# Patient Record
Sex: MALE | Race: WHITE | Employment: FULL TIME | URBAN - METROPOLITAN AREA
[De-identification: names, ages, dates, MRNs, and addresses within clinical notes are randomized per-mention and may not be internally consistent; named-entity substitution may affect disease eponyms.]

---

## 2017-02-23 ENCOUNTER — OFFICE VISIT (OUTPATIENT)
Dept: RHEUMATOLOGY | Age: 47
End: 2017-02-23

## 2017-02-23 VITALS
BODY MASS INDEX: 31.12 KG/M2 | SYSTOLIC BLOOD PRESSURE: 138 MMHG | DIASTOLIC BLOOD PRESSURE: 80 MMHG | HEART RATE: 78 BPM | WEIGHT: 242.4 LBS

## 2017-02-23 DIAGNOSIS — Z79.899 ENCOUNTER FOR LONG-TERM (CURRENT) USE OF MEDICATIONS: ICD-10-CM

## 2017-02-23 DIAGNOSIS — M45.9 ANKYLOSING SPONDYLITIS (HCC): Primary | ICD-10-CM

## 2017-02-23 LAB
ALBUMIN SERPL-MCNC: 4.4 G/DL (ref 3.4–5)
ALP BLD-CCNC: 79 U/L (ref 40–129)
ALT SERPL-CCNC: 27 U/L (ref 10–40)
AST SERPL-CCNC: 20 U/L (ref 15–37)
BASOPHILS ABSOLUTE: 0.1 K/UL (ref 0–0.2)
BASOPHILS RELATIVE PERCENT: 0.7 %
BILIRUB SERPL-MCNC: 0.5 MG/DL (ref 0–1)
BILIRUBIN DIRECT: <0.2 MG/DL (ref 0–0.3)
BILIRUBIN, INDIRECT: NORMAL MG/DL (ref 0–1)
CREAT SERPL-MCNC: 0.9 MG/DL (ref 0.9–1.3)
EOSINOPHILS ABSOLUTE: 0.4 K/UL (ref 0–0.6)
EOSINOPHILS RELATIVE PERCENT: 3.3 %
GFR AFRICAN AMERICAN: >60
GFR NON-AFRICAN AMERICAN: >60
HCT VFR BLD CALC: 45.3 % (ref 40.5–52.5)
HEMOGLOBIN: 14.9 G/DL (ref 13.5–17.5)
LYMPHOCYTES ABSOLUTE: 2.6 K/UL (ref 1–5.1)
LYMPHOCYTES RELATIVE PERCENT: 19.9 %
MCH RBC QN AUTO: 28.3 PG (ref 26–34)
MCHC RBC AUTO-ENTMCNC: 33 G/DL (ref 31–36)
MCV RBC AUTO: 85.8 FL (ref 80–100)
MONOCYTES ABSOLUTE: 1 K/UL (ref 0–1.3)
MONOCYTES RELATIVE PERCENT: 7.7 %
NEUTROPHILS ABSOLUTE: 8.8 K/UL (ref 1.7–7.7)
NEUTROPHILS RELATIVE PERCENT: 68.4 %
PDW BLD-RTO: 14 % (ref 12.4–15.4)
PLATELET # BLD: 247 K/UL (ref 135–450)
PMV BLD AUTO: 8.5 FL (ref 5–10.5)
RBC # BLD: 5.28 M/UL (ref 4.2–5.9)
TOTAL PROTEIN: 7 G/DL (ref 6.4–8.2)
WBC # BLD: 12.9 K/UL (ref 4–11)

## 2017-02-23 PROCEDURE — 99213 OFFICE O/P EST LOW 20 MIN: CPT | Performed by: INTERNAL MEDICINE

## 2017-02-23 RX ORDER — OMEPRAZOLE 20 MG/1
20 CAPSULE, DELAYED RELEASE ORAL DAILY
Qty: 90 CAPSULE | Refills: 1 | Status: SHIPPED | OUTPATIENT
Start: 2017-02-23 | End: 2017-11-03 | Stop reason: SDUPTHER

## 2017-02-28 RX ORDER — SULFASALAZINE 500 MG/1
TABLET ORAL
Qty: 180 TABLET | Refills: 1 | Status: SHIPPED | OUTPATIENT
Start: 2017-02-28 | End: 2017-08-24 | Stop reason: SDUPTHER

## 2017-03-25 DIAGNOSIS — M45.9 ANKYLOSING SPONDYLITIS (HCC): ICD-10-CM

## 2017-03-27 RX ORDER — ETODOLAC 400 MG/1
TABLET, FILM COATED ORAL
Qty: 90 TABLET | Refills: 1 | Status: SHIPPED | OUTPATIENT
Start: 2017-03-27 | End: 2017-08-24 | Stop reason: SDUPTHER

## 2017-08-24 ENCOUNTER — OFFICE VISIT (OUTPATIENT)
Dept: RHEUMATOLOGY | Age: 47
End: 2017-08-24

## 2017-08-24 VITALS
HEART RATE: 78 BPM | SYSTOLIC BLOOD PRESSURE: 118 MMHG | BODY MASS INDEX: 31.25 KG/M2 | WEIGHT: 243.4 LBS | DIASTOLIC BLOOD PRESSURE: 80 MMHG

## 2017-08-24 DIAGNOSIS — M45.7 ANKYLOSING SPONDYLITIS OF LUMBOSACRAL REGION (HCC): Primary | ICD-10-CM

## 2017-08-24 DIAGNOSIS — Z79.899 ENCOUNTER FOR LONG-TERM (CURRENT) USE OF OTHER MEDICATIONS: ICD-10-CM

## 2017-08-24 LAB
ALBUMIN SERPL-MCNC: 4.5 G/DL (ref 3.4–5)
ALP BLD-CCNC: 87 U/L (ref 40–129)
ALT SERPL-CCNC: 30 U/L (ref 10–40)
AST SERPL-CCNC: 24 U/L (ref 15–37)
BASOPHILS ABSOLUTE: 0.1 K/UL (ref 0–0.2)
BASOPHILS RELATIVE PERCENT: 1.3 %
BILIRUB SERPL-MCNC: 0.4 MG/DL (ref 0–1)
BILIRUBIN DIRECT: <0.2 MG/DL (ref 0–0.3)
BILIRUBIN, INDIRECT: NORMAL MG/DL (ref 0–1)
CREAT SERPL-MCNC: 0.9 MG/DL (ref 0.9–1.3)
EOSINOPHILS ABSOLUTE: 0.5 K/UL (ref 0–0.6)
EOSINOPHILS RELATIVE PERCENT: 6.1 %
GFR AFRICAN AMERICAN: >60
GFR NON-AFRICAN AMERICAN: >60
HCT VFR BLD CALC: 47.2 % (ref 40.5–52.5)
HEMOGLOBIN: 15.7 G/DL (ref 13.5–17.5)
LYMPHOCYTES ABSOLUTE: 2.6 K/UL (ref 1–5.1)
LYMPHOCYTES RELATIVE PERCENT: 34 %
MCH RBC QN AUTO: 29.2 PG (ref 26–34)
MCHC RBC AUTO-ENTMCNC: 33.3 G/DL (ref 31–36)
MCV RBC AUTO: 87.6 FL (ref 80–100)
MONOCYTES ABSOLUTE: 0.6 K/UL (ref 0–1.3)
MONOCYTES RELATIVE PERCENT: 7.9 %
NEUTROPHILS ABSOLUTE: 3.9 K/UL (ref 1.7–7.7)
NEUTROPHILS RELATIVE PERCENT: 50.7 %
PDW BLD-RTO: 13.5 % (ref 12.4–15.4)
PLATELET # BLD: 282 K/UL (ref 135–450)
PMV BLD AUTO: 8.4 FL (ref 5–10.5)
RBC # BLD: 5.39 M/UL (ref 4.2–5.9)
TOTAL PROTEIN: 7.3 G/DL (ref 6.4–8.2)
WBC # BLD: 7.6 K/UL (ref 4–11)

## 2017-08-24 PROCEDURE — 99213 OFFICE O/P EST LOW 20 MIN: CPT | Performed by: INTERNAL MEDICINE

## 2017-08-24 RX ORDER — ETODOLAC 400 MG/1
TABLET, FILM COATED ORAL
Qty: 90 TABLET | Refills: 1 | Status: SHIPPED | OUTPATIENT
Start: 2017-08-24 | End: 2018-04-04 | Stop reason: SDUPTHER

## 2017-08-24 RX ORDER — SULFASALAZINE 500 MG/1
TABLET ORAL
Qty: 180 TABLET | Refills: 1 | Status: SHIPPED | OUTPATIENT
Start: 2017-08-24 | End: 2019-10-31 | Stop reason: SDUPTHER

## 2017-11-03 RX ORDER — OMEPRAZOLE 20 MG/1
CAPSULE, DELAYED RELEASE ORAL
Qty: 90 CAPSULE | Refills: 1 | Status: SHIPPED | OUTPATIENT
Start: 2017-11-03 | End: 2018-04-04 | Stop reason: SDUPTHER

## 2018-02-22 ENCOUNTER — TELEPHONE (OUTPATIENT)
Dept: INTERNAL MEDICINE CLINIC | Age: 48
End: 2018-02-22

## 2018-04-04 ENCOUNTER — OFFICE VISIT (OUTPATIENT)
Dept: RHEUMATOLOGY | Age: 48
End: 2018-04-04

## 2018-04-04 VITALS
SYSTOLIC BLOOD PRESSURE: 120 MMHG | HEIGHT: 74 IN | DIASTOLIC BLOOD PRESSURE: 84 MMHG | HEART RATE: 72 BPM | WEIGHT: 249.5 LBS | BODY MASS INDEX: 32.02 KG/M2

## 2018-04-04 DIAGNOSIS — M45.7 ANKYLOSING SPONDYLITIS OF LUMBOSACRAL REGION (HCC): Primary | ICD-10-CM

## 2018-04-04 DIAGNOSIS — Z79.899 ENCOUNTER FOR LONG-TERM (CURRENT) USE OF MEDICATIONS: ICD-10-CM

## 2018-04-04 LAB
ALBUMIN SERPL-MCNC: 4.8 G/DL (ref 3.4–5)
ALP BLD-CCNC: 74 U/L (ref 40–129)
ALT SERPL-CCNC: 38 U/L (ref 10–40)
AST SERPL-CCNC: 26 U/L (ref 15–37)
BASOPHILS ABSOLUTE: 0.1 K/UL (ref 0–0.2)
BASOPHILS RELATIVE PERCENT: 1.3 %
BILIRUB SERPL-MCNC: 0.5 MG/DL (ref 0–1)
BILIRUBIN DIRECT: <0.2 MG/DL (ref 0–0.3)
BILIRUBIN, INDIRECT: NORMAL MG/DL (ref 0–1)
CREAT SERPL-MCNC: 1.1 MG/DL (ref 0.9–1.3)
EOSINOPHILS ABSOLUTE: 0.5 K/UL (ref 0–0.6)
EOSINOPHILS RELATIVE PERCENT: 7 %
GFR AFRICAN AMERICAN: >60
GFR NON-AFRICAN AMERICAN: >60
HCT VFR BLD CALC: 46.5 % (ref 40.5–52.5)
HEMOGLOBIN: 15.8 G/DL (ref 13.5–17.5)
LYMPHOCYTES ABSOLUTE: 2.7 K/UL (ref 1–5.1)
LYMPHOCYTES RELATIVE PERCENT: 39 %
MCH RBC QN AUTO: 29.1 PG (ref 26–34)
MCHC RBC AUTO-ENTMCNC: 34.1 G/DL (ref 31–36)
MCV RBC AUTO: 85.3 FL (ref 80–100)
MONOCYTES ABSOLUTE: 0.8 K/UL (ref 0–1.3)
MONOCYTES RELATIVE PERCENT: 11.4 %
NEUTROPHILS ABSOLUTE: 2.8 K/UL (ref 1.7–7.7)
NEUTROPHILS RELATIVE PERCENT: 41.3 %
PDW BLD-RTO: 13.7 % (ref 12.4–15.4)
PLATELET # BLD: 281 K/UL (ref 135–450)
PMV BLD AUTO: 8 FL (ref 5–10.5)
RBC # BLD: 5.45 M/UL (ref 4.2–5.9)
TOTAL PROTEIN: 7.4 G/DL (ref 6.4–8.2)
WBC # BLD: 6.9 K/UL (ref 4–11)

## 2018-04-04 PROCEDURE — 99213 OFFICE O/P EST LOW 20 MIN: CPT | Performed by: INTERNAL MEDICINE

## 2018-04-04 RX ORDER — OMEPRAZOLE 20 MG/1
CAPSULE, DELAYED RELEASE ORAL
Qty: 90 CAPSULE | Refills: 1 | Status: SHIPPED | OUTPATIENT
Start: 2018-04-04 | End: 2018-10-04 | Stop reason: SDUPTHER

## 2018-04-04 RX ORDER — ETODOLAC 400 MG/1
TABLET, FILM COATED ORAL
Qty: 90 TABLET | Refills: 1 | Status: SHIPPED | OUTPATIENT
Start: 2018-04-04 | End: 2019-04-04 | Stop reason: SDUPTHER

## 2018-10-04 ENCOUNTER — OFFICE VISIT (OUTPATIENT)
Dept: RHEUMATOLOGY | Age: 48
End: 2018-10-04
Payer: COMMERCIAL

## 2018-10-04 VITALS
WEIGHT: 244 LBS | SYSTOLIC BLOOD PRESSURE: 126 MMHG | HEART RATE: 74 BPM | DIASTOLIC BLOOD PRESSURE: 80 MMHG | BODY MASS INDEX: 31.33 KG/M2

## 2018-10-04 DIAGNOSIS — Z79.899 ENCOUNTER FOR LONG-TERM (CURRENT) USE OF MEDICATIONS: ICD-10-CM

## 2018-10-04 DIAGNOSIS — M45.7 ANKYLOSING SPONDYLITIS OF LUMBOSACRAL REGION (HCC): Primary | ICD-10-CM

## 2018-10-04 LAB
ALBUMIN SERPL-MCNC: 4.7 G/DL (ref 3.4–5)
ALP BLD-CCNC: 78 U/L (ref 40–129)
ALT SERPL-CCNC: 37 U/L (ref 10–40)
AST SERPL-CCNC: 27 U/L (ref 15–37)
BASOPHILS ABSOLUTE: 0.1 K/UL (ref 0–0.2)
BASOPHILS RELATIVE PERCENT: 1.3 %
BILIRUB SERPL-MCNC: 0.4 MG/DL (ref 0–1)
BILIRUBIN DIRECT: <0.2 MG/DL (ref 0–0.3)
BILIRUBIN, INDIRECT: NORMAL MG/DL (ref 0–1)
CREAT SERPL-MCNC: 0.9 MG/DL (ref 0.9–1.3)
EOSINOPHILS ABSOLUTE: 0.3 K/UL (ref 0–0.6)
EOSINOPHILS RELATIVE PERCENT: 4.5 %
GFR AFRICAN AMERICAN: >60
GFR NON-AFRICAN AMERICAN: >60
HCT VFR BLD CALC: 44.7 % (ref 40.5–52.5)
HEMOGLOBIN: 15 G/DL (ref 13.5–17.5)
LYMPHOCYTES ABSOLUTE: 2.3 K/UL (ref 1–5.1)
LYMPHOCYTES RELATIVE PERCENT: 36.9 %
MCH RBC QN AUTO: 29.8 PG (ref 26–34)
MCHC RBC AUTO-ENTMCNC: 33.6 G/DL (ref 31–36)
MCV RBC AUTO: 88.6 FL (ref 80–100)
MONOCYTES ABSOLUTE: 0.7 K/UL (ref 0–1.3)
MONOCYTES RELATIVE PERCENT: 10.6 %
NEUTROPHILS ABSOLUTE: 2.9 K/UL (ref 1.7–7.7)
NEUTROPHILS RELATIVE PERCENT: 46.7 %
PDW BLD-RTO: 13.6 % (ref 12.4–15.4)
PLATELET # BLD: 267 K/UL (ref 135–450)
PMV BLD AUTO: 8.2 FL (ref 5–10.5)
RBC # BLD: 5.04 M/UL (ref 4.2–5.9)
TOTAL PROTEIN: 7.3 G/DL (ref 6.4–8.2)
WBC # BLD: 6.2 K/UL (ref 4–11)

## 2018-10-04 PROCEDURE — 99213 OFFICE O/P EST LOW 20 MIN: CPT | Performed by: INTERNAL MEDICINE

## 2018-10-04 RX ORDER — OMEPRAZOLE 20 MG/1
CAPSULE, DELAYED RELEASE ORAL
Qty: 90 CAPSULE | Refills: 1 | Status: SHIPPED | OUTPATIENT
Start: 2018-10-04 | End: 2019-04-04 | Stop reason: SDUPTHER

## 2019-04-04 ENCOUNTER — OFFICE VISIT (OUTPATIENT)
Dept: RHEUMATOLOGY | Age: 49
End: 2019-04-04
Payer: COMMERCIAL

## 2019-04-04 VITALS
SYSTOLIC BLOOD PRESSURE: 124 MMHG | BODY MASS INDEX: 32.08 KG/M2 | DIASTOLIC BLOOD PRESSURE: 80 MMHG | HEART RATE: 88 BPM | HEIGHT: 74 IN | WEIGHT: 250 LBS

## 2019-04-04 DIAGNOSIS — M45.7 ANKYLOSING SPONDYLITIS OF LUMBOSACRAL REGION (HCC): Primary | ICD-10-CM

## 2019-04-04 DIAGNOSIS — Z79.899 ENCOUNTER FOR LONG-TERM (CURRENT) USE OF MEDICATIONS: ICD-10-CM

## 2019-04-04 LAB
ALBUMIN SERPL-MCNC: 4.5 G/DL (ref 3.4–5)
ALP BLD-CCNC: 79 U/L (ref 40–129)
ALT SERPL-CCNC: 45 U/L (ref 10–40)
AST SERPL-CCNC: 33 U/L (ref 15–37)
BASOPHILS ABSOLUTE: 0.1 K/UL (ref 0–0.2)
BASOPHILS RELATIVE PERCENT: 1.2 %
BILIRUB SERPL-MCNC: 0.4 MG/DL (ref 0–1)
BILIRUBIN DIRECT: <0.2 MG/DL (ref 0–0.3)
BILIRUBIN, INDIRECT: ABNORMAL MG/DL (ref 0–1)
CREAT SERPL-MCNC: 0.9 MG/DL (ref 0.9–1.3)
EOSINOPHILS ABSOLUTE: 0.4 K/UL (ref 0–0.6)
EOSINOPHILS RELATIVE PERCENT: 5.7 %
GFR AFRICAN AMERICAN: >60
GFR NON-AFRICAN AMERICAN: >60
HCT VFR BLD CALC: 46.7 % (ref 40.5–52.5)
HEMOGLOBIN: 15.5 G/DL (ref 13.5–17.5)
LYMPHOCYTES ABSOLUTE: 2.5 K/UL (ref 1–5.1)
LYMPHOCYTES RELATIVE PERCENT: 33.7 %
MCH RBC QN AUTO: 29.2 PG (ref 26–34)
MCHC RBC AUTO-ENTMCNC: 33.2 G/DL (ref 31–36)
MCV RBC AUTO: 88 FL (ref 80–100)
MONOCYTES ABSOLUTE: 0.8 K/UL (ref 0–1.3)
MONOCYTES RELATIVE PERCENT: 11 %
NEUTROPHILS ABSOLUTE: 3.5 K/UL (ref 1.7–7.7)
NEUTROPHILS RELATIVE PERCENT: 48.4 %
PDW BLD-RTO: 13.5 % (ref 12.4–15.4)
PLATELET # BLD: 282 K/UL (ref 135–450)
PMV BLD AUTO: 8.3 FL (ref 5–10.5)
RBC # BLD: 5.31 M/UL (ref 4.2–5.9)
TOTAL PROTEIN: 7.2 G/DL (ref 6.4–8.2)
WBC # BLD: 7.3 K/UL (ref 4–11)

## 2019-04-04 PROCEDURE — 99213 OFFICE O/P EST LOW 20 MIN: CPT | Performed by: INTERNAL MEDICINE

## 2019-04-04 RX ORDER — OMEPRAZOLE 20 MG/1
CAPSULE, DELAYED RELEASE ORAL
Qty: 90 CAPSULE | Refills: 1 | Status: SHIPPED | OUTPATIENT
Start: 2019-04-04 | End: 2019-10-31 | Stop reason: SDUPTHER

## 2019-04-04 RX ORDER — ETODOLAC 400 MG/1
TABLET, FILM COATED ORAL
Qty: 90 TABLET | Refills: 1 | Status: SHIPPED | OUTPATIENT
Start: 2019-04-04 | End: 2019-10-31 | Stop reason: SDUPTHER

## 2019-04-04 NOTE — PROGRESS NOTES
Subjective:      Patient ID: Kai Presley is a 57 y.o. male. HPI  The patient returns for follow-up of ankylosing spondylitis. He continues on Lodine 400 mg once a day and Azulfidine 1000 mg daily. He can do all ADLs. Review of Systems  Denies abdominal pain denies nausea denies rash denies back pain denies morning stiffness  Objective:   Physical Exam  /80   Pulse 88   Ht 6' 2\" (1.88 m)   Wt 250 lb (113.4 kg)   BMI 32.10 kg/m² alert  Male in no acute distress lungs clear to auscultation cardiovascular exam normal sinus rhythm. Musculoskeletal exam revealed neck full range of motion back full range of motion no peripheral synovitis  Assessment: Ankylosing spondylitis stable      Plan:      Blood work was obtained today to monitor for adverse drug reactions. Laboratory studies from last visit were reviewed. I'll see patient back in 6 months time.         Gunner Tang MD

## 2019-10-31 ENCOUNTER — OFFICE VISIT (OUTPATIENT)
Dept: RHEUMATOLOGY | Age: 49
End: 2019-10-31
Payer: COMMERCIAL

## 2019-10-31 VITALS
DIASTOLIC BLOOD PRESSURE: 94 MMHG | HEART RATE: 64 BPM | HEIGHT: 74 IN | WEIGHT: 257.8 LBS | BODY MASS INDEX: 33.09 KG/M2 | SYSTOLIC BLOOD PRESSURE: 136 MMHG

## 2019-10-31 DIAGNOSIS — Z79.899 ENCOUNTER FOR LONG-TERM (CURRENT) USE OF MEDICATIONS: ICD-10-CM

## 2019-10-31 DIAGNOSIS — M45.7 ANKYLOSING SPONDYLITIS OF LUMBOSACRAL REGION (HCC): Primary | ICD-10-CM

## 2019-10-31 LAB
ALBUMIN SERPL-MCNC: 4.6 G/DL (ref 3.4–5)
ALP BLD-CCNC: 84 U/L (ref 40–129)
ALT SERPL-CCNC: 44 U/L (ref 10–40)
AST SERPL-CCNC: 34 U/L (ref 15–37)
BASOPHILS ABSOLUTE: 0.1 K/UL (ref 0–0.2)
BASOPHILS RELATIVE PERCENT: 1.1 %
BILIRUB SERPL-MCNC: 0.5 MG/DL (ref 0–1)
BILIRUBIN DIRECT: <0.2 MG/DL (ref 0–0.3)
BILIRUBIN, INDIRECT: ABNORMAL MG/DL (ref 0–1)
CREAT SERPL-MCNC: 0.9 MG/DL (ref 0.9–1.3)
EOSINOPHILS ABSOLUTE: 0.3 K/UL (ref 0–0.6)
EOSINOPHILS RELATIVE PERCENT: 4 %
GFR AFRICAN AMERICAN: >60
GFR NON-AFRICAN AMERICAN: >60
HCT VFR BLD CALC: 47.3 % (ref 40.5–52.5)
HEMOGLOBIN: 15.8 G/DL (ref 13.5–17.5)
LYMPHOCYTES ABSOLUTE: 2.9 K/UL (ref 1–5.1)
LYMPHOCYTES RELATIVE PERCENT: 38.7 %
MCH RBC QN AUTO: 29.4 PG (ref 26–34)
MCHC RBC AUTO-ENTMCNC: 33.3 G/DL (ref 31–36)
MCV RBC AUTO: 88.1 FL (ref 80–100)
MONOCYTES ABSOLUTE: 0.9 K/UL (ref 0–1.3)
MONOCYTES RELATIVE PERCENT: 11.9 %
NEUTROPHILS ABSOLUTE: 3.3 K/UL (ref 1.7–7.7)
NEUTROPHILS RELATIVE PERCENT: 44.3 %
PDW BLD-RTO: 13.5 % (ref 12.4–15.4)
PLATELET # BLD: 268 K/UL (ref 135–450)
PMV BLD AUTO: 8.3 FL (ref 5–10.5)
RBC # BLD: 5.37 M/UL (ref 4.2–5.9)
TOTAL PROTEIN: 7.6 G/DL (ref 6.4–8.2)
WBC # BLD: 7.5 K/UL (ref 4–11)

## 2019-10-31 PROCEDURE — 99213 OFFICE O/P EST LOW 20 MIN: CPT | Performed by: INTERNAL MEDICINE

## 2019-10-31 RX ORDER — SULFASALAZINE 500 MG/1
TABLET ORAL
Qty: 180 TABLET | Refills: 1 | Status: SHIPPED | OUTPATIENT
Start: 2019-10-31 | End: 2019-12-11 | Stop reason: SDUPTHER

## 2019-10-31 RX ORDER — OMEPRAZOLE 20 MG/1
CAPSULE, DELAYED RELEASE ORAL
Qty: 90 CAPSULE | Refills: 1 | Status: SHIPPED | OUTPATIENT
Start: 2019-10-31 | End: 2019-12-11 | Stop reason: SDUPTHER

## 2019-10-31 RX ORDER — ETODOLAC 400 MG/1
TABLET, FILM COATED ORAL
Qty: 90 TABLET | Refills: 1 | Status: SHIPPED | OUTPATIENT
Start: 2019-10-31 | End: 2019-12-11 | Stop reason: SDUPTHER

## 2019-12-11 ENCOUNTER — TELEPHONE (OUTPATIENT)
Dept: INTERNAL MEDICINE CLINIC | Age: 49
End: 2019-12-11

## 2019-12-11 DIAGNOSIS — M45.7 ANKYLOSING SPONDYLITIS OF LUMBOSACRAL REGION (HCC): ICD-10-CM

## 2019-12-11 DIAGNOSIS — Z79.899 ENCOUNTER FOR LONG-TERM (CURRENT) USE OF MEDICATIONS: ICD-10-CM

## 2019-12-11 RX ORDER — SULFASALAZINE 500 MG/1
TABLET ORAL
Qty: 180 TABLET | Refills: 1 | Status: SHIPPED | OUTPATIENT
Start: 2019-12-11 | End: 2020-04-13 | Stop reason: SDUPTHER

## 2019-12-11 RX ORDER — ETODOLAC 400 MG/1
TABLET, FILM COATED ORAL
Qty: 90 TABLET | Refills: 1 | Status: SHIPPED | OUTPATIENT
Start: 2019-12-11 | End: 2020-04-13 | Stop reason: SDUPTHER

## 2019-12-11 RX ORDER — OMEPRAZOLE 20 MG/1
CAPSULE, DELAYED RELEASE ORAL
Qty: 90 CAPSULE | Refills: 1 | Status: SHIPPED | OUTPATIENT
Start: 2019-12-11 | End: 2020-04-13 | Stop reason: SDUPTHER

## 2020-03-31 ENCOUNTER — VIRTUAL VISIT (OUTPATIENT)
Dept: RHEUMATOLOGY | Age: 50
End: 2020-03-31
Payer: COMMERCIAL

## 2020-03-31 PROCEDURE — 99213 OFFICE O/P EST LOW 20 MIN: CPT | Performed by: INTERNAL MEDICINE

## 2020-03-31 NOTE — PROGRESS NOTES
Pursuant to the emergency declaration under the [facility], [facility] waiver authority and the [employer] Act, this Virtual  Visit was conducted, with patient's consent, to reduce the patient's risk of exposure to COVID-19 and provide continuity of care for an established patient. Services were provided through a video synchronous discussion virtually to substitute for in-person clinic visit.

## 2020-04-13 ENCOUNTER — TELEPHONE (OUTPATIENT)
Dept: INTERNAL MEDICINE CLINIC | Age: 50
End: 2020-04-13

## 2020-04-13 LAB
ALBUMIN SERPL-MCNC: 4.4 G/DL (ref 3.4–5)
ALP BLD-CCNC: 78 U/L (ref 40–129)
ALT SERPL-CCNC: 54 U/L (ref 10–40)
AST SERPL-CCNC: 32 U/L (ref 15–37)
BASOPHILS ABSOLUTE: 0.1 K/UL (ref 0–0.2)
BASOPHILS RELATIVE PERCENT: 1.2 %
BILIRUB SERPL-MCNC: 0.5 MG/DL (ref 0–1)
BILIRUBIN DIRECT: <0.2 MG/DL (ref 0–0.3)
BILIRUBIN, INDIRECT: ABNORMAL MG/DL (ref 0–1)
CREAT SERPL-MCNC: 0.8 MG/DL (ref 0.9–1.3)
EOSINOPHILS ABSOLUTE: 0.5 K/UL (ref 0–0.6)
EOSINOPHILS RELATIVE PERCENT: 6.7 %
GFR AFRICAN AMERICAN: >60
GFR NON-AFRICAN AMERICAN: >60
HCT VFR BLD CALC: 48.2 % (ref 40.5–52.5)
HEMOGLOBIN: 15.9 G/DL (ref 13.5–17.5)
LYMPHOCYTES ABSOLUTE: 2.8 K/UL (ref 1–5.1)
LYMPHOCYTES RELATIVE PERCENT: 38.4 %
MCH RBC QN AUTO: 29.1 PG (ref 26–34)
MCHC RBC AUTO-ENTMCNC: 32.9 G/DL (ref 31–36)
MCV RBC AUTO: 88.5 FL (ref 80–100)
MONOCYTES ABSOLUTE: 0.8 K/UL (ref 0–1.3)
MONOCYTES RELATIVE PERCENT: 11.1 %
NEUTROPHILS ABSOLUTE: 3.1 K/UL (ref 1.7–7.7)
NEUTROPHILS RELATIVE PERCENT: 42.6 %
PDW BLD-RTO: 13.8 % (ref 12.4–15.4)
PLATELET # BLD: 245 K/UL (ref 135–450)
PMV BLD AUTO: 8.5 FL (ref 5–10.5)
RBC # BLD: 5.45 M/UL (ref 4.2–5.9)
TOTAL PROTEIN: 7.1 G/DL (ref 6.4–8.2)
WBC # BLD: 7.3 K/UL (ref 4–11)

## 2020-04-13 RX ORDER — SULFASALAZINE 500 MG/1
TABLET ORAL
Qty: 180 TABLET | Refills: 0 | Status: SHIPPED | OUTPATIENT
Start: 2020-04-13 | End: 2020-11-02 | Stop reason: SINTOL

## 2020-04-13 RX ORDER — ETODOLAC 400 MG/1
TABLET, FILM COATED ORAL
Qty: 90 TABLET | Refills: 0 | Status: SHIPPED | OUTPATIENT
Start: 2020-04-13 | End: 2021-01-30

## 2020-04-13 RX ORDER — OMEPRAZOLE 20 MG/1
CAPSULE, DELAYED RELEASE ORAL
Qty: 90 CAPSULE | Refills: 0 | Status: SHIPPED | OUTPATIENT
Start: 2020-04-13 | End: 2020-11-02 | Stop reason: ALTCHOICE

## 2020-04-13 NOTE — TELEPHONE ENCOUNTER
Pt calling for refills of his meds---coming today to do his blood work--please send to his mail in pharmacy. Thanks.

## 2020-07-27 ENCOUNTER — TELEPHONE (OUTPATIENT)
Dept: INTERNAL MEDICINE CLINIC | Age: 50
End: 2020-07-27

## 2020-07-27 RX ORDER — PREDNISONE 1 MG/1
TABLET ORAL
Qty: 30 TABLET | Refills: 0 | Status: SHIPPED | OUTPATIENT
Start: 2020-07-27 | End: 2020-08-13 | Stop reason: SDUPTHER

## 2020-07-27 NOTE — TELEPHONE ENCOUNTER
Pt calling about his meds ---stopped the Sulfasalzine and etodolac back in June when he started having all kinds of problems---rash-breathing-chest pain-has been to [facility] multiple times---had upper GI and some other tests run at [facility]---he was off these meds and got better (after taking antibotic and prednisone) he started them up again 7/13 for about a week and half and it has all started again---please call the pt. Thanks.

## 2020-08-11 ENCOUNTER — TELEPHONE (OUTPATIENT)
Dept: INTERNAL MEDICINE CLINIC | Age: 50
End: 2020-08-11

## 2020-08-11 NOTE — TELEPHONE ENCOUNTER
Spoke with the patient had an EGD which showed inflammation not sure if this is characteristic of Crohn's ulcerative colitis or just a reaction to his nonsteroidal will contact Dr. Maria Elena Ortega who performed the EGD and will get back to the patient.

## 2020-08-11 NOTE — TELEPHONE ENCOUNTER
Called for more information. He was placed on Sulfasalazine and Lodine. He has been to the ER several times. He was given 2 weeks of prednisone. He is starting to get a lot of inflammation lately. He states that he is still recovering from this whole ordeal. He had a lot of swelling and pain. He took tylenol and Ibuprofen on Sunday evening. He went back to having the same side effects as he did before. Asking for Surya Stratton to call him. He is feeling inflamed and he can feel it in \"his organs\". Wondering if there is any other tests or scans he can do that will give him answers?

## 2020-08-11 NOTE — TELEPHONE ENCOUNTER
Called Dr Montse Jasmine office. [phone], [facility] office and spoke with [facility]. They will page him to call back.

## 2020-08-12 ENCOUNTER — TELEPHONE (OUTPATIENT)
Dept: RHEUMATOLOGY | Age: 50
End: 2020-08-12

## 2020-08-12 DIAGNOSIS — M45.7 ANKYLOSING SPONDYLITIS OF LUMBOSACRAL REGION (HCC): ICD-10-CM

## 2020-08-12 DIAGNOSIS — M79.10 MYALGIA: ICD-10-CM

## 2020-08-12 NOTE — TELEPHONE ENCOUNTER
Dr Wen Cornejo spoke with  Dr Jaden Sheth and pt. Pt will review information on Taltz and Cosentyx. Will get quantiferon done. Order in epic.

## 2020-08-13 LAB — TSH SERPL DL<=0.05 MIU/L-ACNC: 0.78 UIU/ML (ref 0.27–4.2)

## 2020-08-13 RX ORDER — PREDNISONE 1 MG/1
TABLET ORAL
Qty: 30 TABLET | Refills: 0 | Status: SHIPPED | OUTPATIENT
Start: 2020-08-13 | End: 2020-11-02 | Stop reason: ALTCHOICE

## 2020-08-13 NOTE — TELEPHONE ENCOUNTER
Castro Estrella spoke with the pt. He needs to be seen ASAP for Evaluation. Offered appt this evening, pt declined. Scheduled pt to come in on Monday. 5 mg prednisone pills 20 mg for 3 days 15 mg for 3 days 10 mg for 3 days then 5 mg for 3 days     Pended prednisone.

## 2020-08-16 LAB
QUANTI TB GOLD PLUS: NEGATIVE
QUANTI TB1 MINUS NIL: 0.03 IU/ML (ref 0–0.34)
QUANTI TB2 MINUS NIL: 0 IU/ML (ref 0–0.34)
QUANTIFERON MITOGEN: 9.95 IU/ML
QUANTIFERON NIL: 0.06 IU/ML

## 2020-08-17 ENCOUNTER — OFFICE VISIT (OUTPATIENT)
Dept: RHEUMATOLOGY | Age: 50
End: 2020-08-17
Payer: COMMERCIAL

## 2020-08-17 ENCOUNTER — TELEPHONE (OUTPATIENT)
Dept: RHEUMATOLOGY | Age: 50
End: 2020-08-17

## 2020-08-17 VITALS
DIASTOLIC BLOOD PRESSURE: 88 MMHG | SYSTOLIC BLOOD PRESSURE: 129 MMHG | TEMPERATURE: 98 F | WEIGHT: 250.8 LBS | HEART RATE: 86 BPM | BODY MASS INDEX: 31.86 KG/M2

## 2020-08-17 PROCEDURE — 99214 OFFICE O/P EST MOD 30 MIN: CPT | Performed by: INTERNAL MEDICINE

## 2020-08-17 NOTE — PROGRESS NOTES
Subjective:      Patient ID: Obed Zapien is a 52 y.o. male. HPI                        patient comes in unexpectedly with a history of feeling poorly since June when he started having trouble breathing  He said he broke out sweat will turn red he complains of tightness in his throat when he ate all his headaches and feeling bloated. [facility] given prednisone and antibiotic without relief. He stopped the sulfasalazine as well as a total of. He was reevaluated several days later at [facility].  He was given another course of prednisone and a different antibiotic. Initially he thought he was improving but then his symptoms recurred and he went to [facility].  He had a thorough examination including CT chest and pelvis to rule out pulmonary embolus as well as cardiac issues. The patient consistently feels better while on prednisone  Review of Systems  He complains however of a lot of flatus and frequent loose stools without blood. Denies fever. He complains of the sensation that he cannot get a deep breath when he is not on prednisone  Objective:   Physical Exam  /88   Pulse 86   Temp 98 °F (36.7 °C)   Wt 250 lb 12.8 oz (113.8 kg)   BMI 31.86 kg/m² Cushingoid  Alert male in no acute distress lungs clear to auscultation no friction rubs. Examination of the abdomen no guarding or rebound no hepatosplenomegaly no vertebral body tenderness. No CVA tenderness no synovitis  assessment:      Dyspnea frequent loose stools history of ankylosing spondylitis      Plan:    I reviewed the patient's work-up  And I believe he would benefit from a colonoscopy whether this will show plan with bowel disease or C. difficile will need to be determined. The patient will contact Dr. Geoffrey Strickland regarding work-up of his lower GI symptoms. Short term the patient will be maintained on low-dose prednisone.         Madhu Obando MD

## 2020-08-17 NOTE — TELEPHONE ENCOUNTER
Pt called in to let Dr Kimberlee Couch know that the GI dr was unable to get him in until 8/31. I did advise him that was a quick appointment for a specialist.  He stated he can't wait that long. He would like to speak with Dr Kimberlee Couch regarding questions about starting injections in the meantime and seeing if Dr Kimberlee Couch is able to get him in sooner to see GI.

## 2020-08-18 ENCOUNTER — TELEPHONE (OUTPATIENT)
Dept: INTERNAL MEDICINE CLINIC | Age: 50
End: 2020-08-18

## 2020-08-18 NOTE — TELEPHONE ENCOUNTER
Spoke with the patient reviewed his concerns about his vitamin intake this along with Zyrtec and Benadryl earlier this year when his symptoms began. I do not believe that this is the cause of his symptoms at this time. He will see GI on Thursday.

## 2020-08-18 NOTE — TELEPHONE ENCOUNTER
Pt called, asked if Dr. Agustin Estrada would please call him. He would like to discuss his medication he was taking when his SOB started.

## 2020-08-20 RX ORDER — PREDNISONE 1 MG/1
5 TABLET ORAL DAILY
Qty: 30 TABLET | Refills: 1 | Status: SHIPPED | OUTPATIENT
Start: 2020-08-20 | End: 2020-11-02 | Stop reason: SINTOL

## 2020-08-24 LAB
ALBUMIN SERPL-MCNC: 4.7 G/DL (ref 3.4–5)
ALP BLD-CCNC: 78 U/L (ref 40–129)
ALT SERPL-CCNC: 73 U/L (ref 10–40)
AST SERPL-CCNC: 31 U/L (ref 15–37)
BILIRUB SERPL-MCNC: 0.8 MG/DL (ref 0–1)
BILIRUBIN DIRECT: <0.2 MG/DL (ref 0–0.3)
BILIRUBIN, INDIRECT: ABNORMAL MG/DL (ref 0–1)
TOTAL PROTEIN: 7.3 G/DL (ref 6.4–8.2)

## 2020-08-25 ENCOUNTER — TELEPHONE (OUTPATIENT)
Dept: INTERNAL MEDICINE CLINIC | Age: 50
End: 2020-08-25

## 2020-08-25 NOTE — TELEPHONE ENCOUNTER
Pt calling having some brathing problems that you were going to speak to another Dr Adore Clark call the pt. Thanks.

## 2020-08-27 ENCOUNTER — TELEPHONE (OUTPATIENT)
Dept: RHEUMATOLOGY | Age: 50
End: 2020-08-27

## 2020-08-27 NOTE — TELEPHONE ENCOUNTER
Per Dr Wendy Head need to speak with Dr Edith Meléndez call left message with Sami Cotter at office Gave back number [phone]

## 2020-09-03 ENCOUNTER — TELEPHONE (OUTPATIENT)
Dept: RHEUMATOLOGY | Age: 50
End: 2020-09-03

## 2020-09-03 DIAGNOSIS — Z20.822 EXPOSURE TO 2019 NOVEL CORONAVIRUS: ICD-10-CM

## 2020-09-03 LAB
ALBUMIN SERPL-MCNC: 4.8 G/DL (ref 3.4–5)
ALP BLD-CCNC: 84 U/L (ref 40–129)
ALT SERPL-CCNC: 63 U/L (ref 10–40)
AST SERPL-CCNC: 32 U/L (ref 15–37)
BILIRUB SERPL-MCNC: 0.6 MG/DL (ref 0–1)
BILIRUBIN DIRECT: <0.2 MG/DL (ref 0–0.3)
BILIRUBIN, INDIRECT: ABNORMAL MG/DL (ref 0–1)
SARS-COV-2 ANTIBODY, TOTAL: NEGATIVE
TOTAL PROTEIN: 7.4 G/DL (ref 6.4–8.2)

## 2020-09-03 NOTE — TELEPHONE ENCOUNTER
Patient states he has not seen much improvement- still has same symptoms and would like to discuss with you about it. Please call back.

## 2020-09-03 NOTE — TELEPHONE ENCOUNTER
Spoke with the patient still having left-sided chest discomfort right upper quadrant discomfort pale stools now. Has flushing episodes after eating certain foods. Patient will come by today to get up liver enzymes checked and bilirubin. Next week if he still the same will have to consider pheochromocytoma pulmonary emboli to account for some of his symptoms.

## 2020-09-04 ENCOUNTER — TELEPHONE (OUTPATIENT)
Dept: RHEUMATOLOGY | Age: 50
End: 2020-09-04

## 2020-09-04 NOTE — TELEPHONE ENCOUNTER
Spoke with pt. He is wondering if Crystal Sweet can call him back Tuesday. He is just at a loss and is not sure what to do next. He is still struggling with SOB and GI Issues.

## 2020-09-08 ENCOUNTER — HOSPITAL ENCOUNTER (OUTPATIENT)
Dept: CT IMAGING | Age: 50
Discharge: HOME | End: 2020-09-08
Payer: COMMERCIAL

## 2020-09-08 PROCEDURE — 6360000004 HC RX CONTRAST MEDICATION: Performed by: INTERNAL MEDICINE

## 2020-09-08 PROCEDURE — 71275 CT ANGIOGRAPHY CHEST: CPT

## 2020-09-08 PROCEDURE — 71250 CT THORAX DX C-: CPT

## 2020-09-08 PROCEDURE — 71260 CT THORAX DX C+: CPT

## 2020-09-08 RX ADMIN — IOPAMIDOL 75 ML: 755 INJECTION, SOLUTION INTRAVENOUS at 13:14

## 2020-09-09 ENCOUNTER — TELEPHONE (OUTPATIENT)
Dept: RHEUMATOLOGY | Age: 50
End: 2020-09-09

## 2020-09-09 NOTE — TELEPHONE ENCOUNTER
Called pt and advised per Dr Agustin Estrada that CT showed no PE, no interstitual fibroids, lungs good. He stated his SOB is better when he eats or drinks. He had to be NPO 4 hrs prior and had a lot of diffculty when he went in to get ct done. He is concerned about inflammation below the lungs causing issue. He is concerned about medications and effect on liver. He is wanted to discuss this with Dr Agustin Estrada further on Thursday.

## 2020-09-21 ENCOUNTER — TELEPHONE (OUTPATIENT)
Dept: INTERNAL MEDICINE CLINIC | Age: 50
End: 2020-09-21

## 2020-09-21 LAB
ALBUMIN SERPL-MCNC: 5 G/DL (ref 3.4–5)
ALP BLD-CCNC: 79 U/L (ref 40–129)
ALT SERPL-CCNC: 39 U/L (ref 10–40)
AST SERPL-CCNC: 25 U/L (ref 15–37)
BILIRUB SERPL-MCNC: 0.8 MG/DL (ref 0–1)
BILIRUBIN DIRECT: <0.2 MG/DL (ref 0–0.3)
BILIRUBIN, INDIRECT: NORMAL MG/DL (ref 0–1)
C-REACTIVE PROTEIN: 0.7 MG/L (ref 0–5.1)
HBV SURFACE AB TITR SER: <3.5 MIU/ML
HEPATITIS B CORE IGM ANTIBODY: NORMAL
HEPATITIS B SURFACE ANTIGEN INTERPRETATION: NORMAL
HEPATITIS C ANTIBODY INTERPRETATION: NORMAL
TOTAL CK: 140 U/L (ref 39–308)
TOTAL PROTEIN: 7.7 G/DL (ref 6.4–8.2)

## 2020-09-21 NOTE — TELEPHONE ENCOUNTER
Pt calling wanted to talk to you still having a lot if inflammation had thought he had drug allergy but had stopped taking it but started it again on Sat---wanted to talk to you about this---please call pt when you can. Thanks.

## 2020-09-24 ENCOUNTER — TELEPHONE (OUTPATIENT)
Dept: INTERNAL MEDICINE CLINIC | Age: 50
End: 2020-09-24

## 2020-09-24 NOTE — TELEPHONE ENCOUNTER
----- Message from Ann Hinojosa sent at 9/23/2020 10:32 AM EDT -----  Subject: Message to Provider    QUESTIONS  Information for Provider? Pt is calling to check on test results. Please   return call.   ---------------------------------------------------------------------------  --------------  CALL BACK INFO  What is the best way for the office to contact you? OK to leave message on   voicemail  Preferred Call Back Phone Number? [phone]  ---------------------------------------------------------------------------  --------------  SCRIPT ANSWERS  Relationship to Patient?  Self

## 2020-09-24 NOTE — TELEPHONE ENCOUNTER
Patient called back to get results. Advised him Dr. Zelalem Montesinos will be in the office this afternoon and will advise.

## 2020-09-25 LAB
ALBUMIN SERPL-MCNC: 4.6 G/DL (ref 3.4–5)
ALP BLD-CCNC: 85 U/L (ref 40–129)
ALT SERPL-CCNC: 33 U/L (ref 10–40)
AST SERPL-CCNC: 25 U/L (ref 15–37)
BILIRUB SERPL-MCNC: 0.4 MG/DL (ref 0–1)
BILIRUBIN DIRECT: <0.2 MG/DL (ref 0–0.3)
BILIRUBIN, INDIRECT: NORMAL MG/DL (ref 0–1)
FERRITIN: 76 NG/ML (ref 30–400)
IRON SATURATION: 22 % (ref 20–50)
IRON: 93 UG/DL (ref 59–158)
TOTAL IRON BINDING CAPACITY: 421 UG/DL (ref 260–445)
TOTAL PROTEIN: 7.4 G/DL (ref 6.4–8.2)

## 2020-09-26 LAB — ANTI-NUCLEAR ANTIBODY (ANA): NEGATIVE

## 2020-09-28 LAB — HAV AB SERPL IA-ACNC: NEGATIVE

## 2020-09-29 LAB
ALPHA-1 ANTITRYPSIN: 66 MG/DL (ref 90–200)
CERULOPLASMIN: 21 MG/DL (ref 15–30)

## 2020-10-09 LAB
ALPHA-1 ANTITRYPSIN: 72 MG/DL (ref 90–200)
HAV AB SERPL IA-ACNC: NEGATIVE

## 2020-10-13 LAB
ALBUMIN SERPL-MCNC: 4.6 G/DL (ref 3.4–5)
ALP BLD-CCNC: 86 U/L (ref 40–129)
ALT SERPL-CCNC: 35 U/L (ref 10–40)
AST SERPL-CCNC: 35 U/L (ref 15–37)
BILIRUB SERPL-MCNC: 0.7 MG/DL (ref 0–1)
BILIRUBIN DIRECT: <0.2 MG/DL (ref 0–0.3)
BILIRUBIN, INDIRECT: NORMAL MG/DL (ref 0–1)
TOTAL PROTEIN: 7.3 G/DL (ref 6.4–8.2)

## 2020-10-26 LAB
ALPHA-1 ANTITRYPSIN PHENOTYPE: ABNORMAL
ALPHA-1 ANTITRYPSIN: 77 MG/DL (ref 90–200)

## 2020-10-27 ENCOUNTER — TELEPHONE (OUTPATIENT)
Dept: PULMONOLOGY | Age: 50
End: 2020-10-27

## 2020-10-27 NOTE — TELEPHONE ENCOUNTER
Dr. Seble Louie   See attached scan Dr. Mau Givens is referring the patient for alpha 1 antitrypsin low , eval for pulmonary lung disease. Please advise on urgency?

## 2020-10-29 ENCOUNTER — TELEPHONE (OUTPATIENT)
Dept: INTERNAL MEDICINE CLINIC | Age: 50
End: 2020-10-29

## 2020-10-29 NOTE — TELEPHONE ENCOUNTER
Patient is requesting to speak to Dr Sandria Hashimoto. He states he wants to ask your opinion on doctors in his network. Patient states he is still not getting better.

## 2020-10-29 NOTE — TELEPHONE ENCOUNTER
Patient called stating that Dr. Sandria Hashimoto has referred him to see Dr. Marlen Perez. He states he is having problems with breathing, stomach pains, light colored stool, burning in throat. Patient has had several COVID-19 tests and have been negative. He would like to know if Dr. Marlen Perez will take him as a new patient and how soon he can get an appointment?     Please call to advise

## 2020-10-30 ENCOUNTER — TELEPHONE (OUTPATIENT)
Dept: INTERNAL MEDICINE CLINIC | Age: 50
End: 2020-10-30

## 2020-10-30 NOTE — TELEPHONE ENCOUNTER
Pt calling ---you were wanting him to go see Dr Carney Apley office told him they were not taking new pts and if they did they would have nothing until December---he said if this was the case you might call this Dr to try to gt him in. Please let pt what else to do---Thanks.

## 2020-11-02 ENCOUNTER — OFFICE VISIT (OUTPATIENT)
Dept: INTERNAL MEDICINE CLINIC | Age: 50
End: 2020-11-02
Payer: COMMERCIAL

## 2020-11-02 VITALS
HEART RATE: 84 BPM | TEMPERATURE: 97 F | SYSTOLIC BLOOD PRESSURE: 142 MMHG | WEIGHT: 239 LBS | HEIGHT: 74 IN | DIASTOLIC BLOOD PRESSURE: 88 MMHG | BODY MASS INDEX: 30.67 KG/M2

## 2020-11-02 PROCEDURE — 99204 OFFICE O/P NEW MOD 45 MIN: CPT | Performed by: INTERNAL MEDICINE

## 2020-11-02 RX ORDER — SUCRALFATE ORAL 1 G/10ML
SUSPENSION ORAL
COMMUNITY
Start: 2020-10-09 | End: 2020-11-02 | Stop reason: ALTCHOICE

## 2020-11-02 RX ORDER — ESOMEPRAZOLE MAGNESIUM 40 MG/1
40 FOR SUSPENSION ORAL 2 TIMES DAILY
COMMUNITY
End: 2021-11-16

## 2020-11-02 RX ORDER — NORTRIPTYLINE HYDROCHLORIDE 10 MG/1
10 CAPSULE ORAL NIGHTLY
COMMUNITY
Start: 2020-10-26 | End: 2021-02-19 | Stop reason: SDUPTHER

## 2020-11-02 ASSESSMENT — PATIENT HEALTH QUESTIONNAIRE - PHQ9
2. FEELING DOWN, DEPRESSED OR HOPELESS: 1
SUM OF ALL RESPONSES TO PHQ QUESTIONS 1-9: 1
SUM OF ALL RESPONSES TO PHQ9 QUESTIONS 1 & 2: 1
SUM OF ALL RESPONSES TO PHQ QUESTIONS 1-9: 1
SUM OF ALL RESPONSES TO PHQ QUESTIONS 1-9: 1
1. LITTLE INTEREST OR PLEASURE IN DOING THINGS: 0

## 2020-11-02 NOTE — PROGRESS NOTES
alone  No children      Family History   Problem Relation Age of Onset    Liver Disease Mother              Atrial Fibrillation Father     Diabetes type 2  Father     Arthritis Father        ROS  Neg for fevers, initially had flushing and sweating but this is better now  Chronic headaches  Vision is OK  tightness in throat  Neg for active chest pain  Neg for cough or wheezing  Bm about once a day sometimes loose stool, usually formed, neg for blood in stool, stool has been light in color  No difficulty urine, neg for dysuria or hematuria  No current joint or back pain  Skin itches and has rash at time  +anxiety, neg for anhedonia, on occasion mood is depressed  Neg for bruising or bleeding    EXAM:  BP (!) 142/88   Pulse 84   Temp 97 °F (36.1 °C) (Temporal)   Ht 6' 2.4\" (1.89 m)   Wt 239 lb (108.4 kg)   BMI 30.36 kg/m²    Gen: WN/WD, no acute distress  Head: normocephalic, atraumatic  Eyes: no icterus, no conjunctival erythema. Pupils reactive to light. ENT: Moist mucous membranes, normal appearing nose, OP pink and moist  Neck: supple, no masses  CV: regular rate and rhythm, no murmurs, rubs, gallops. No peripheral edema  Resp: Normal effort, clear to auscultation bilaterally  Abd: +Bowel Sounds, soft, non tender to palpation. MSK: no joint swelling, no cyanosis or clubbing  Skin: warm, dry, no rashes visualized  Neuro: Cranial Nerves intact, no focal motor deficits  Psych: normal mood and affect. Appropriately oriented.     Lab Results   Component Value Date    CREATININE 0.8 (L) 04/13/2020     Lab Results   Component Value Date    ALT 35 10/13/2020    AST 35 10/13/2020    ALKPHOS 86 10/13/2020    BILITOT 0.7 10/13/2020      Lab Results   Component Value Date    WBC 7.3 04/13/2020    HGB 15.9 04/13/2020    HCT 48.2 04/13/2020    MCV 88.5 04/13/2020     04/13/2020      Lab Results   Component Value Date    TSH 0.78 08/12/2020          A/P  Generally healthy 53 y/o male with 6 months of dyspnea, abdomen and chest pain, testing to date reviewed and unrevealing    1. RUQ pain  Some symptoms are c/w gallbladder pathology. CT imaging negative, but will check HIDA scan with [facility]. Given generalized abdominal pain symptoms, will r/o adrenal insufficiency  Given his age, vascular disease is unlikely, but consider workup for chronic mesenteric ischemia if other tests remain negative and symptoms persist  - NM HIDA SCAN W EF (TRANSCRIBED); Future  - Cortisol AM, Total  - Renal Function Panel  - ACTH    2. Alpha-1-antitrypsin deficiency ([facility] [address]  He reports normal PFTs at hospital in [place]. Lung Exam is unremarkable today. He will see pulmonary, Dr. Latoya Garcia in one week    3. Elevated BP without diagnosis of hypertension  Mildly elevated BP at appt today. Will monitor and if persistent discuss treatment options.     RTO one month

## 2020-11-03 ENCOUNTER — TELEPHONE (OUTPATIENT)
Dept: INTERNAL MEDICINE CLINIC | Age: 50
End: 2020-11-03

## 2020-11-03 NOTE — TELEPHONE ENCOUNTER
Pt calling has his hideascan tennille for this Friday but needs you to do his precert for it---please call [phone] and call pt with the precert #after to give to the hospital. Thanks.

## 2020-11-04 NOTE — TELEPHONE ENCOUNTER
I tried to do an authorization but did not have the CPT was told I needed to contact [facility]. They did not have a number to refer me too and I dont have the information needed.

## 2020-11-07 LAB
ALBUMIN SERPL-MCNC: 4.7 G/DL (ref 3.4–5)
ANION GAP SERPL CALCULATED.3IONS-SCNC: 11 MMOL/L (ref 3–16)
BUN BLDV-MCNC: 15 MG/DL (ref 7–20)
CALCIUM SERPL-MCNC: 9.6 MG/DL (ref 8.3–10.6)
CHLORIDE BLD-SCNC: 100 MMOL/L (ref 99–110)
CO2: 28 MMOL/L (ref 21–32)
CORTISOL - AM: 11.1 UG/DL (ref 4.3–22.4)
CREAT SERPL-MCNC: 0.7 MG/DL (ref 0.9–1.3)
GFR AFRICAN AMERICAN: >60
GFR NON-AFRICAN AMERICAN: >60
GLUCOSE BLD-MCNC: 81 MG/DL (ref 70–99)
PHOSPHORUS: 3.4 MG/DL (ref 2.5–4.9)
POTASSIUM SERPL-SCNC: 4.4 MMOL/L (ref 3.5–5.1)
SODIUM BLD-SCNC: 139 MMOL/L (ref 136–145)

## 2020-11-09 ENCOUNTER — HOSPITAL ENCOUNTER (OUTPATIENT)
Dept: NUCLEAR MEDICINE | Age: 50
Discharge: HOME | End: 2020-11-09
Payer: COMMERCIAL

## 2020-11-09 ENCOUNTER — OFFICE VISIT (OUTPATIENT)
Dept: PULMONOLOGY | Age: 50
End: 2020-11-09
Payer: COMMERCIAL

## 2020-11-09 VITALS
WEIGHT: 239.2 LBS | HEIGHT: 74 IN | TEMPERATURE: 98.4 F | DIASTOLIC BLOOD PRESSURE: 80 MMHG | SYSTOLIC BLOOD PRESSURE: 118 MMHG | RESPIRATION RATE: 12 BRPM | BODY MASS INDEX: 30.7 KG/M2 | OXYGEN SATURATION: 98 % | HEART RATE: 87 BPM

## 2020-11-09 PROBLEM — Z14.8 ALPHA-1-ANTITRYPSIN DEFICIENCY CARRIER: Status: ACTIVE | Noted: 2020-11-09

## 2020-11-09 PROBLEM — G47.10 HYPERSOMNIA: Status: ACTIVE | Noted: 2020-11-09

## 2020-11-09 PROBLEM — R21 RASH: Status: ACTIVE | Noted: 2020-11-09

## 2020-11-09 PROBLEM — R06.02 SOB (SHORTNESS OF BREATH): Status: ACTIVE | Noted: 2020-11-09

## 2020-11-09 LAB — ADRENOCORTICOTROPIC HORMONE: 18 PG/ML (ref 7–69)

## 2020-11-09 PROCEDURE — 99204 OFFICE O/P NEW MOD 45 MIN: CPT | Performed by: INTERNAL MEDICINE

## 2020-11-09 PROCEDURE — 78227 HEPATOBIL SYST IMAGE W/DRUG: CPT

## 2020-11-09 PROCEDURE — 3430000000 HC RX DIAGNOSTIC RADIOPHARMACEUTICAL: Performed by: INTERNAL MEDICINE

## 2020-11-09 PROCEDURE — A9537 TC99M MEBROFENIN: HCPCS | Performed by: INTERNAL MEDICINE

## 2020-11-09 RX ORDER — ALBUTEROL SULFATE 90 UG/1
POWDER, METERED RESPIRATORY (INHALATION) EVERY 4 HOURS PRN
COMMUNITY
End: 2021-11-16

## 2020-11-09 RX ORDER — BUDESONIDE AND FORMOTEROL FUMARATE DIHYDRATE 160; 4.5 UG/1; UG/1
2 AEROSOL RESPIRATORY (INHALATION) 2 TIMES DAILY
COMMUNITY
End: 2021-11-16

## 2020-11-09 RX ORDER — ALBUTEROL SULFATE 2.5 MG/3ML
2.5 SOLUTION RESPIRATORY (INHALATION) EVERY 6 HOURS PRN
COMMUNITY
End: 2021-11-16

## 2020-11-09 RX ADMIN — Medication 6 MILLICURIE: at 10:25

## 2020-11-09 NOTE — ASSESSMENT & PLAN NOTE
Patient has a normal high-resolution CT chest, normal stress echo, normal echocardiogram.  He had pulmonary function test completed in [place]. I do not have access to this, the patient is understanding of this is normal as well. While the patient has alpha 1 antitrypsin carrier status, this typically does not typically lead to emphysema or pulmonary issues. He was self diagnosed with asthma the age 27 and treated with Primatene Mist.  It is not clear whether the patient had a methacholine challenge with a pulmonary function test, but he reports a normal pulmonary function test.  In addition, he has not responded to albuterol and Symbicort. Therefore, it is unlikely that he has asthma at this time. CT chest is negative for emphysema. Therefore, he was advised he can stop his inhaled medications. I will obtain the outside pulmonary function test.  At this point, no additional testing. This note was transcribed using [id number] [address]. Please disregard any translational errors.

## 2020-11-09 NOTE — ASSESSMENT & PLAN NOTE
The patient is a carrier of alpha-1 antitrypsin. Patient's alpha-1 level is 66 and 77 for the last 2 test respectively. Genetics of PI MZ typically does not need treatment. No signs of cirrhosis based on work-up and no signs of emphysema on CT chest.  Pulmonary function test are reported as being normal.  Therefore, with no sequelae of alpha-1 antitrypsin and current carrier status, he was advised he does not need treatment for alpha-1 antitrypsin.

## 2020-11-09 NOTE — ASSESSMENT & PLAN NOTE
Patient has a rash of his neck. Unclear etiology. This does not resolve with stopping medications. Refer to dermatology.

## 2020-11-09 NOTE — ASSESSMENT & PLAN NOTE
Standing lab orders, ordered on 2/3/2017, have .  Please review pended standing orders, complete required items, and sign if approved.  Please advise patient of any changes to care plan.    Patient would also like to complete needed labs for his upcoming visit with Dr. Pastor, to be collected at the [facility] 2 days prior to appointment.  Please enter these orders as future orders.  Thank you.     The patient has a strong family history of sleep apnea. He has purchased 2 machines off of [employer] and is improved with treatment. With his shortness of breath, he stopped using his BiPAP. He is not clear whether this is an auto BiPAP. By the description, it is likely ResMed S9. Therefore, to get him an official diagnosis and proper treatment, I will order a home sleep study followed by a in lab titration study. He is currently seeing 2 pulmonologist.  I defer to him whether he wanted to follow-up with me or Dr. Dyan Ann.

## 2020-11-09 NOTE — LETTER
[facility], Sleep [facility] [address] [place] [address]  Phone: [phone]  Fax: [phone]    No ref. provider found        November 9, 2020       Patient: Verenice Hernandez   MR Number: <[id number]>   YOB: 1970   Date of Visit: 11/9/2020       Dear Dr. Papa Chiang    Thank you for the request for consultation for Anahi Topete to me for the evaluation of shortness of breath . Below are the relevant portions of my assessment and plan of care. Problem List Items Addressed This Visit     SOB (shortness of breath)     Patient has a normal high-resolution CT chest, normal stress echo, normal echocardiogram.  He had pulmonary function test completed in [place]. I do not have access to this, the patient is understanding of this is normal as well. While the patient has alpha 1 antitrypsin carrier status, this typically does not typically lead to emphysema or pulmonary issues. He was self diagnosed with asthma the age 27 and treated with Primatene Mist.  It is not clear whether the patient had a methacholine challenge with a pulmonary function test, but he reports a normal pulmonary function test.  In addition, he has not responded to albuterol and Symbicort. Therefore, it is unlikely that he has asthma at this time. CT chest is negative for emphysema. Therefore, he was advised he can stop his inhaled medications. I will obtain the outside pulmonary function test.  At this point, no additional testing. This note was transcribed using [id number]. Please disregard any translational errors. Rash     Patient has a rash of his neck. Unclear etiology. This does not resolve with stopping medications. Refer to dermatology. Relevant Orders    Mei Salazar MD, Dermatology, [facility]    Hypersomnia - Primary     The patient has a strong family history of sleep apnea.   He has

## 2020-11-09 NOTE — PROGRESS NOTES
completed with a level of 77 and phenotype of PIMZ    Outside records requested and reviewed. Stress echo completed within [facility] with EKG. All being normal.  He also had a pulmonary function test.  This is reported being normal.         Rash  He believes it started with treatment of ankylosing spondylitis with sulfasalazine and Lodine. However, this is now resolved with stopping these medications. Rash is mostly located on neck. Does not wax and wane and no change with treatment. Obesity  He has recently been losing weight. this is been associated with a change in diet with abdominal pain. Patient has a HIDA scan scheduled for later today. PAST MEDICAL HISTORY:  Past Medical History:   Diagnosis Date    Ankylosing spondylitis ([facility] [address]     Fatty liver        PAST SURGICAL HISTORY:  Past Surgical History:   Procedure Laterality Date    HERNIA REPAIR Right 2000       FAMILY HISTORY:  family history includes Arthritis in his father; Atrial Fibrillation in his father; Diabetes type 2  in his father; Liver Disease in his mother. SOCIAL HISTORY:   reports that he has never smoked. He has never used smokeless tobacco.      ALLERGIES:  Patient is allergic to pcn [penicillins]. REVIEW OF SYSTEMS:  Constitutional: Negative for fever    HENT: Negative for sore throat  Eyes: Negative for redness   Respiratory: ++ for dyspnea, - cough  Cardiovascular: Negative for chest pain  Gastrointestinal: Negative for vomiting, diarrhea   Genitourinary: Negative for hematuria   Musculoskeletal: Negative for arthralgias   Skin: Negative for rash  Neurological: Negative for syncope  Hematological: Negative for adenopathy  Psychiatric/Behavorial: Negative for anxiety    Objective:   PHYSICAL EXAM:  Blood pressure 118/80, pulse 87, temperature 98.4 °F (36.9 °C), temperature source Oral, resp. rate 12, height 6' 2.4\" (1.89 m), weight 239 lb 3.2 oz (108.5 kg), SpO2 98 %.'  Gen: No distress.   Body mass index is 30.38 kg/m². Eyes: PERRL. No sclera icterus. No conjunctival injection. ENT: No discharge. Pharynx clear. External appearance of ears and nose normal. Mallampati  3  Neck: Trachea midline. No obvious mass. Resp: No accessory muscle use. No crackles. No wheezes. No rhonchi. CV: Regular rate. Regular rhythm. No murmur or rub. No edema. GI: Non-tender. Non-distended. No hernia. Skin: Warm, dry, normal texture and turgor. No nodule on exposed extremities. Erythema of neck. Lymph: No cervical LAD. No supraclavicular LAD. M/S: No cyanosis. No clubbing. No joint deformity. Neuro: Moves all four extremities. Psych: Oriented x 3. No anxiety. Awake. Alert. Intact judgement and insight. .      Current Outpatient Medications   Medication Sig Dispense Refill    albuterol (PROVENTIL) (2.5 MG/3ML) 0.083% nebulizer solution Take 2.5 mg by nebulization every 6 hours as needed for Wheezing      albuterol sulfate (PROAIR RESPICLICK) 871 (90 Base) MCG/ACT aerosol powder inhalation Inhale into the lungs every 4 hours as needed for Wheezing or Shortness of Breath      budesonide-formoterol (SYMBICORT) 160-4.5 MCG/ACT AERO Inhale 2 puffs into the lungs 2 times daily      nortriptyline (PAMELOR) 10 MG capsule Take 10 mg by mouth nightly       esomeprazole Magnesium (NEXIUM) 40 MG PACK Take 40 mg by mouth 2 times daily      etodolac (LODINE) 400 MG tablet TAKE 1 TABLET DAILY 90 tablet 0     No current facility-administered medications for this visit.       Facility-Administered Medications Ordered in Other Visits   Medication Dose Route Frequency Provider Last Rate Last Dose    technetium mebrofenin (CHOLETEC) injection 6 millicurie  6 millicurie Intravenous ONCE PRN Ellen Poole MD           Data Reviewed:   CBC and Renal reviewed  Last CBC  Lab Results   Component Value Date    WBC 7.3 04/13/2020    RBC 5.45 04/13/2020    HGB 15.9 04/13/2020    MCV 88.5 04/13/2020     disease    FINDINGS:  Mediastinum: Heart is normal in size. No mediastinal, hilar, or axillary  lymphadenopathy. No pulmonary embolism. HRCT Findings/Lungs/pleura: The lungs are clear. No pleural effusion or  pneumothorax. Upper Abdomen: Fatty liver. Small hiatal hernia. Soft Tissues/Bones: Unremarkable. Impression 1. No pulmonary embolism. 2. No evidence of interstitial lung disease. 3. Fatty liver         CXR PA/LAT:   Results for orders placed in visit on 06/25/20   XR CHEST STANDARD (2 VW)    Narrative                                 *FINAL REPORT*                                 [facility]                               [address]                            [address], [address]                                   RADIOLOGY REPORT    Patient Name: Lesley York #: [id number]                                        FIN:      [id number]  YOB: 1970                                      Age/Sex:  52 / M  Attending Physician:   Tran Guillen D.O. Family Physician:      Sofiya Webb M.D. Ordering Physician:    Tran Guillen D.O.  SvcDT: 06/25/2020      EXAM:  XR CHEST 2 VIEWS  [id number]    CLINICAL INDICATION:  SOB. FINDINGS:  No consolidative infiltrate is suspected. Mild asymmetric prominence of the left infrahilar region. This is felt most  likely to reflect superimposition of vascular shadows. No definitive hilar  fullness is suspected on lateral projection. Mild prominence of the  pulmonary vessels may be present. Cardiopericardial silhouette is within  normal limits. IMPRESSION:  No acute infiltrate suspected. Mild asymmetric prominence of the left infrahilar region likely reflect  superimposition of vessels. Pulmonary vessels may be mildly prominent. Follow-up PA and lateral chest may be helpful for improved characterization. Correlation with older studies, if available, would be helpful.       Electronically Signed By: [facility] [place]    Hypersomnia - Primary     The patient has a strong family history of sleep apnea. He has purchased 2 machines off of [employer] and is improved with treatment. With his shortness of breath, he stopped using his BiPAP. He is not clear whether this is an auto BiPAP. By the description, it is likely ResMed S9. Therefore, to get him an official diagnosis and proper treatment, I will order a home sleep study followed by a in lab titration study. He is currently seeing 2 pulmonologist.  I defer to him whether he wanted to follow-up with me or Dr. Naomi Bear. Relevant Orders    Home Sleep Study    Sleep Study with PAP Titration    Alpha-1-antitrypsin deficiency carrier     The patient is a carrier of alpha-1 antitrypsin. Patient's alpha-1 level is 66 and 77 for the last 2 test respectively. Genetics of PI MZ typically does not need treatment. No signs of cirrhosis based on work-up and no signs of emphysema on CT chest.  Pulmonary function test are reported as being normal.  Therefore, with no sequelae of alpha-1 antitrypsin and current carrier status, he was advised he does not need treatment for alpha-1 antitrypsin. This note was transcribed using [email or web address]. Please disregard any translational errors.     Edgardo Ayala Pul[facility], Sleep and [facility] [phone]

## 2020-11-11 ENCOUNTER — HOSPITAL ENCOUNTER (OUTPATIENT)
Dept: SLEEP CENTER | Age: 50
Discharge: HOME | End: 2020-11-11
Payer: COMMERCIAL

## 2020-11-11 PROCEDURE — 95806 SLEEP STUDY UNATT&RESP EFFT: CPT

## 2020-11-11 PROCEDURE — 95806 SLEEP STUDY UNATT&RESP EFFT: CPT | Performed by: PSYCHIATRY & NEUROLOGY

## 2020-11-17 ENCOUNTER — TELEPHONE (OUTPATIENT)
Dept: PULMONOLOGY | Age: 50
End: 2020-11-17

## 2020-11-17 NOTE — TELEPHONE ENCOUNTER
Spoke to Ric and let him know his HST indicated he is positive for mild SOURAV with an AHI of 12.9/hr with O2 sats as low as 79% at times.   Titration recommended Gave number to call and schedule this

## 2020-11-30 ENCOUNTER — OFFICE VISIT (OUTPATIENT)
Dept: PRIMARY CARE CLINIC | Age: 50
End: 2020-11-30
Payer: COMMERCIAL

## 2020-11-30 PROCEDURE — 99211 OFF/OP EST MAY X REQ PHY/QHP: CPT | Performed by: NURSE PRACTITIONER

## 2020-11-30 NOTE — PROGRESS NOTES
Patient presented to [facility] drive up clinic for preop testing. Patient was swabbed and given information advising them to remain isolated until procedure date.

## 2020-12-01 ENCOUNTER — OFFICE VISIT (OUTPATIENT)
Dept: INTERNAL MEDICINE CLINIC | Age: 50
End: 2020-12-01
Payer: COMMERCIAL

## 2020-12-01 VITALS
HEIGHT: 74 IN | DIASTOLIC BLOOD PRESSURE: 84 MMHG | BODY MASS INDEX: 31.32 KG/M2 | SYSTOLIC BLOOD PRESSURE: 134 MMHG | HEART RATE: 84 BPM | WEIGHT: 244 LBS | TEMPERATURE: 95.8 F

## 2020-12-01 LAB — SARS-COV-2: NOT DETECTED

## 2020-12-01 PROCEDURE — 99214 OFFICE O/P EST MOD 30 MIN: CPT | Performed by: INTERNAL MEDICINE

## 2020-12-01 NOTE — PROGRESS NOTES
CC: abdominal pain, fatigue, rash    HPI:  The patient is presenting with chronic abdominal pain. Location: RUQ  Quality: stabbing, shooting at times, recently aching  Onset: end of June  Radiation: throughout the abdomen   Aggravating factors: eating fatty foods, taking medicine, drinking alcohol  Alleviating factors :drinking pop  Associated symptoms: fatigue    He also reports a rash that comes and goes on his neck and his chest.        Past Medical History:   Diagnosis Date    [facility]ylosing spondylitis ([facility] [address]     Fatty liver       Social History     Tobacco Use    Smoking status: Never Smoker    Smokeless tobacco: Never Used   Substance Use Topics    Alcohol use: Not Currently              ROS:  Reg BM's, occasionally loose  Denies recent weight loss  He reports normal libido      EXAM:  /84   Pulse 84   Temp 95.8 °F (35.4 °C) (Temporal)   Ht 6' 2.4\" (1.89 m)   Wt 244 lb (110.7 kg)   BMI 30.99 kg/m²    GEN: WN/WD, NAD  CV: regular rate and rhythm, no murmurs rubs or gallops  Resp: normal effort, clear auscultation bilaterally  No peripheral edema   Abd: soft, nontender to palpation. Rash: confluent red patch on anterior neck and upper chest  Dry skin on palms, hands    Lab Results   Component Value Date    ALT 35 10/13/2020    AST 35 10/13/2020    ALKPHOS 86 10/13/2020    BILITOT 0.7 10/13/2020      Lab Results   Component Value Date    WBC 7.3 04/13/2020    HGB 15.9 04/13/2020    HCT 48.2 04/13/2020    MCV 88.5 04/13/2020     04/13/2020      Lab Results   Component Value Date    TSH 0.78 08/12/2020      Cortisol WNL    A/P  1. Generalized abdominal pain  Workup has been negative and has included CT scan, EGD X2, RUQ U/S, colonoscopy, HIDA scan. AM Cortisol was WNL  With check C1 inhibitory, tryptase, lipase  Consider gen surgery referral if workup negative  - C1 ESTERASE INHIBITOR TOTAL  - LIPASE    2. Rash  Unclear etiology. Pruritic, comes and goes.   Will check Tryptase level  - TRYPTASE    3. Chronic fatigue  Likely secondary to above.   Consider endocrinology referral.  - Testosterone, free, total

## 2020-12-02 LAB — LIPASE: 33 U/L (ref 13–60)

## 2020-12-04 ENCOUNTER — HOSPITAL ENCOUNTER (OUTPATIENT)
Dept: SLEEP CENTER | Age: 50
Discharge: HOME | End: 2020-12-04
Payer: COMMERCIAL

## 2020-12-04 LAB
SEX HORMONE BINDING GLOBULIN: 28 NMOL/L (ref 11–80)
TESTOSTERONE FREE-NONMALE: 77.8 PG/ML (ref 47–244)
TESTOSTERONE TOTAL: 354 NG/DL (ref 220–1000)

## 2020-12-04 PROCEDURE — 95811 POLYSOM 6/>YRS CPAP 4/> PARM: CPT

## 2020-12-04 PROCEDURE — 95811 POLYSOM 6/>YRS CPAP 4/> PARM: CPT | Performed by: PSYCHIATRY & NEUROLOGY

## 2020-12-06 LAB — C1 ESTERASE INHIBITOR: 32 MG/DL (ref 21–39)

## 2020-12-07 LAB — TRYPTASE: 5 UG/L

## 2020-12-10 ENCOUNTER — TELEPHONE (OUTPATIENT)
Dept: PULMONOLOGY | Age: 50
End: 2020-12-10

## 2020-12-10 NOTE — TELEPHONE ENCOUNTER
Spoke to Ric and let him know his titration study showed his sleep apnea was controlled with a bpap at pressure settings of 17/13. He said he has 2 machines at home  He believes one is a cpap and one is a bpap and this is mentioned in his last office note. He wants to know if he can schedule an appt and bring the machines in and see if Dr Yesenia Perdue would be able to set the pressures in order to avoid getting a new machine? ?

## 2020-12-11 ENCOUNTER — OFFICE VISIT (OUTPATIENT)
Dept: PULMONOLOGY | Age: 50
End: 2020-12-11
Payer: COMMERCIAL

## 2020-12-11 VITALS
TEMPERATURE: 97.3 F | DIASTOLIC BLOOD PRESSURE: 80 MMHG | OXYGEN SATURATION: 97 % | SYSTOLIC BLOOD PRESSURE: 130 MMHG | BODY MASS INDEX: 31.78 KG/M2 | HEIGHT: 74 IN | RESPIRATION RATE: 12 BRPM | WEIGHT: 247.6 LBS | HEART RATE: 101 BPM

## 2020-12-11 PROCEDURE — 99213 OFFICE O/P EST LOW 20 MIN: CPT | Performed by: INTERNAL MEDICINE

## 2020-12-11 ASSESSMENT — SLEEP AND FATIGUE QUESTIONNAIRES
HOW LIKELY ARE YOU TO NOD OFF OR FALL ASLEEP WHILE SITTING AND TALKING TO SOMEONE: 0
HOW LIKELY ARE YOU TO NOD OFF OR FALL ASLEEP IN A CAR, WHILE STOPPED FOR A FEW MINUTES IN TRAFFIC: 0
HOW LIKELY ARE YOU TO NOD OFF OR FALL ASLEEP WHILE SITTING INACTIVE IN A PUBLIC PLACE: 1
HOW LIKELY ARE YOU TO NOD OFF OR FALL ASLEEP WHILE SITTING QUIETLY AFTER LUNCH WITHOUT ALCOHOL: 2
ESS TOTAL SCORE: 11
HOW LIKELY ARE YOU TO NOD OFF OR FALL ASLEEP WHEN YOU ARE A PASSENGER IN A CAR FOR AN HOUR WITHOUT A BREAK: 0
HOW LIKELY ARE YOU TO NOD OFF OR FALL ASLEEP WHILE LYING DOWN TO REST IN THE AFTERNOON WHEN CIRCUMSTANCES PERMIT: 3
HOW LIKELY ARE YOU TO NOD OFF OR FALL ASLEEP WHILE SITTING AND READING: 2
HOW LIKELY ARE YOU TO NOD OFF OR FALL ASLEEP WHILE WATCHING TV: 3

## 2020-12-11 NOTE — ASSESSMENT & PLAN NOTE
He brought his BiPAP machine in for evaluation. It is an auto BiPAP and settings of minimum EPAP 10, maximum IPAP 18, maximum pressure support 5. He is currently using a [employer] full facemask. He has had issues with pressure on the bridge of his nose with mask in the past.  We discussed the ResMed F 30 I. He will consider this.

## 2020-12-11 NOTE — LETTER
[facility]   [address] [facility] [place]   Phone: [phone]  Fax: [phone]     12/11/2020    Dr. Jorden Spatz, MD    I have seen your patient, Callum Huerta on follow up. Here is the assessment and plan for your patient. Any question, please do not hesitate to call. Problem List Items Addressed This Visit     SOB (shortness of breath)     He continues to have episodes of shortness of breath. I have no physiologic reason for this. He has not responded to his Symbicort and albuterol in the past.  He continues to suspect that he is a \"long hauler \"from COVID-19 but was never tested positive for this. High-resolution CT chest was normal in September. Pulmonary function test, completed by Dr. Marina Kelley, was reported as normal.    No further work-up at this time. SOURAV (obstructive sleep apnea)     He brought his BiPAP machine in for evaluation. It is an auto BiPAP and settings of minimum EPAP 10, maximum IPAP 18, maximum pressure support 5. He is currently using a Simplus full facemask. He has had issues with pressure on the bridge of his nose with mask in the past.  We discussed the ResMed F 30 I. He will consider this. Alpha-1-antitrypsin deficiency carrier      we again discussed this carrier status and how this does not need to be treated.                  Sincerely,    Edgardo Ayala Pulmonary, Sleep and Critical Care  [phone]

## 2020-12-11 NOTE — PROGRESS NOTES
Angelina FOR CONSULTATION/CC:  shortness of breath   Chief Complaint   Patient presents with    Sleep Apnea        Consult at request of   Daisy Tyson MD for sob     PCP: Daisy Tyson MD    HISTORY OF PRESENT ILLNESS: Jemima Monsivais is a 52y.o. year old male with a history of ankylosis spondylitis who presents          shortness of breath  Work up has still been negative. He was never positive for covid but is concerned that he is a long-hauler from COVID-19. Since last visit, he continues to have symptoms but has had more good days then bad days but still has bad. SOURAV    Changed to 10-18 PS max 5. He has had issues with pressure on the bridge of his nose from a nasal mask. PAST MEDICAL HISTORY:  Past Medical History:   Diagnosis Date    Ankylosing spondylitis ([facility] [address]     Fatty liver        PAST SURGICAL HISTORY:  Past Surgical History:   Procedure Laterality Date    HERNIA REPAIR Right 2000       FAMILY HISTORY:  family history includes Arthritis in his father; Atrial Fibrillation in his father; Diabetes type 2  in his father; Liver Disease in his mother. SOCIAL HISTORY:   reports that he has never smoked. He has never used smokeless tobacco.      ALLERGIES:  Patient is allergic to pcn [penicillins]. REVIEW OF SYSTEMS:  Constitutional: Negative for fever    HENT: Negative for sore throat  Eyes: Negative for redness   Respiratory: ++ for dyspnea, - cough  Cardiovascular: Negative for chest pain  Gastrointestinal: Negative for vomiting, diarrhea   Genitourinary: Negative for hematuria   Musculoskeletal: Negative for arthralgias   Skin: Negative for rash  Neurological: Negative for syncope  Hematological: Negative for adenopathy  Psychiatric/Behavorial: Negative for anxiety    Objective:   PHYSICAL EXAM:  Blood pressure 130/80, pulse 101, temperature 97.3 °F (36.3 °C), temperature source Temporal, resp.  rate 12, height 6' 2.4\" (1.89 m), weight 247 lb 9.6 oz (112.3 kg), SpO2 97 %.'  Gen: No distress. Body mass index is 31.45 kg/m². Eyes: PERRL. No sclera icterus. No conjunctival injection. ENT: No discharge. Pharynx clear. External appearance of ears and nose normal. Mallampati  3  Neck: Trachea midline. No obvious mass. Resp: No accessory muscle use. No crackles. No wheezes. No rhonchi. CV: Regular rate. Regular rhythm. No murmur or rub. No edema. GI: Non-tender. Non-distended. No hernia. Skin: Warm, dry, normal texture and turgor. No nodule on exposed extremities. Erythema of neck. Lymph: No cervical LAD. No supraclavicular LAD. M/S: No cyanosis. No clubbing. No joint deformity. Neuro: Moves all four extremities. Psych: Oriented x 3. No anxiety. Awake. Alert. Intact judgement and insight. .      Current Outpatient Medications   Medication Sig Dispense Refill    albuterol (PROVENTIL) (2.5 MG/3ML) 0.083% nebulizer solution Take 2.5 mg by nebulization every 6 hours as needed for Wheezing      albuterol sulfate (PROAIR RESPICLICK) 953 (90 Base) MCG/ACT aerosol powder inhalation Inhale into the lungs every 4 hours as needed for Wheezing or Shortness of Breath      nortriptyline (PAMELOR) 10 MG capsule Take 10 mg by mouth nightly       esomeprazole Magnesium (NEXIUM) 40 MG PACK Take 40 mg by mouth 2 times daily      etodolac (LODINE) 400 MG tablet TAKE 1 TABLET DAILY 90 tablet 0    budesonide-formoterol (SYMBICORT) 160-4.5 MCG/ACT AERO Inhale 2 puffs into the lungs 2 times daily       No current facility-administered medications for this visit.         Data Reviewed:   CBC and Renal reviewed  Last CBC  Lab Results   Component Value Date    WBC 7.3 04/13/2020    RBC 5.45 04/13/2020    HGB 15.9 04/13/2020    MCV 88.5 04/13/2020     04/13/2020     Last Renal  Lab Results   Component Value Date     11/06/2020    K 4.4 11/06/2020     11/06/2020    CO2 28 11/06/2020    BUN 15 11/06/2020    CREATININE 0.7 11/06/2020    GLUCOSE 81 11/06/2020    CALCIUM 9.6 11/06/2020       Last ABG  POC Blood Gas: No results found for: POCPH, POCPCO2, POCPO2, POCHCO3, NBEA, [id number]  No results for input(s): PH, PCO2, PO2, HCO3, BE, O2SAT in the last 72 hours. Radiology Review:  Pertinent images / reports were reviewed as a part of this visit. CT Chest w/ contrast: No results found for this or any previous visit. CT Chest w/o contrast: No results found for this or any previous visit. CTPA:   Results for orders placed during the hospital encounter of 09/08/20   CT CHEST PULMONARY EMBOLISM W CONTRAST    Narrative EXAMINATION:  CT IMAGES OF THE CHEST WITHOUT CONTRAST, HIGH RESOLUTION; CTA OF THE CHEST  9/8/2020    TECHNIQUE:  CT of the chest was performed without the administration of intravenous  contrast. High resolution CT imaging was performed of the lungs. Multiplanar  reformatted images are provided for review. Dose modulation, iterative  reconstruction, and/or weight based adjustment of the mA/kV was utilized to  reduce the radiation dose to as low as reasonably achievable.; CTA of the  chest was performed after the administration of intravenous contrast.  Multiplanar reformatted images are provided for review. MIP images are  provided for review. Dose modulation, iterative reconstruction, and/or weight  based adjustment of the mA/kV was utilized to reduce the radiation dose to as  low as reasonably achievable. High resolution CT images were performed in the  supine inspiration, supine expiration, and prone inspiration positions. COMPARISON:  None. HISTORY:  ORDERING SYSTEM PROVIDED HISTORY: Shortness of breath  TECHNOLOGIST PROVIDED HISTORY:  Reason for Exam: sob for 3 months poss interstitual lung disease    FINDINGS:  Mediastinum: Heart is normal in size. No mediastinal, hilar, or axillary  lymphadenopathy. No pulmonary embolism. HRCT Findings/Lungs/pleura: The lungs are clear. No pleural effusion or  pneumothorax.     Upper Abdomen: Fatty liver. Small hiatal hernia. Soft Tissues/Bones: Unremarkable. Impression 1. No pulmonary embolism. 2. No evidence of interstitial lung disease. 3. Fatty liver         CXR PA/LAT:   Results for orders placed in visit on 06/25/20   XR CHEST STANDARD (2 VW)    Narrative                                 *FINAL REPORT*                                 [facility]                               [address]                            [address], [address]                                   RADIOLOGY REPORT    Patient Name: Jody Farrell #: [id number]                                        FIN:      [id number]  YOB: 1970                                      Age/Sex:  52 / M  Attending Physician:   Gary Cabral D.O. Family Physician:      Liu Weathers M.D. Ordering Physician:    Gary Cabral D.O.  SvcDT: 06/25/2020      EXAM:  XR CHEST 2 VIEWS  [id number]    CLINICAL INDICATION:  SOB. FINDINGS:  No consolidative infiltrate is suspected. Mild asymmetric prominence of the left infrahilar region. This is felt most  likely to reflect superimposition of vascular shadows. No definitive hilar  fullness is suspected on lateral projection. Mild prominence of the  pulmonary vessels may be present. Cardiopericardial silhouette is within  normal limits. IMPRESSION:  No acute infiltrate suspected. Mild asymmetric prominence of the left infrahilar region likely reflect  superimposition of vessels. Pulmonary vessels may be mildly prominent. Follow-up PA and lateral chest may be helpful for improved characterization. Correlation with older studies, if available, would be helpful.       Electronically Signed By: Roma Hester MD  Electronically Signed On: 6/25/2020 at 8:41 am  Read By: Roma Hester MD  Dictated:    6/25/2020 at 8:41 am  Transcribed:   Job Number:  [id number] REPORT   ** Final **     Dictated: 06/25/2020 8:45 am     Susannah Santiago MD    Signed (Electronic Signature):  06/25/2020 8:41 am  Signed by:  Susannah Santiago MD         CXR portable: No results found for this or any previous visit. Assessment:     · Self diagnosed asthma  · Alpha-1 antitrypsin deficiency  · Fatty liver  · Ankylosing spondylitis  · Hypersomnia with strong family history of sleep apnea  · Rash  · Shortness of breath      Plan:      Problem List Items Addressed This Visit     SOB (shortness of breath)     He continues to have episodes of shortness of breath. I have no physiologic reason for this. He has not responded to his Symbicort and albuterol in the past.  He continues to suspect that he is a \"long hauler \"from COVID-19 but was never tested positive for this. High-resolution CT chest was normal in September. Pulmonary function test, completed by Dr. Александр Acevedo, was reported as normal.    No further work-up at this time. SOUARV (obstructive sleep apnea)     He brought his BiPAP machine in for evaluation. It is an auto BiPAP and settings of minimum EPAP 10, maximum IPAP 18, maximum pressure support 5. He is currently using a [employer] full facemask. He has had issues with pressure on the bridge of his nose with mask in the past.  We discussed the ResMed F 30 I. He will consider this. Alpha-1-antitrypsin deficiency carrier      we again discussed this carrier status and how this does not need to be treated. This note was transcribed using [id number] [employer]. Please disregard any translational errors.     Edgardo Ayala Pulmonary, Sleep and [facility] [phone]

## 2021-01-30 ENCOUNTER — HOSPITAL ENCOUNTER (EMERGENCY)
Age: 51
Discharge: HOME | End: 2021-01-30
Attending: EMERGENCY MEDICINE
Payer: COMMERCIAL

## 2021-01-30 VITALS
BODY MASS INDEX: 33.13 KG/M2 | RESPIRATION RATE: 16 BRPM | HEART RATE: 94 BPM | WEIGHT: 258.16 LBS | OXYGEN SATURATION: 97 % | DIASTOLIC BLOOD PRESSURE: 103 MMHG | SYSTOLIC BLOOD PRESSURE: 135 MMHG | TEMPERATURE: 98.4 F | HEIGHT: 74 IN

## 2021-01-30 DIAGNOSIS — H65.02 NON-RECURRENT ACUTE SEROUS OTITIS MEDIA OF LEFT EAR: Primary | ICD-10-CM

## 2021-01-30 PROCEDURE — 99283 EMERGENCY DEPT VISIT LOW MDM: CPT

## 2021-01-30 RX ORDER — GUAIFENESIN 600 MG/1
600 TABLET, EXTENDED RELEASE ORAL 2 TIMES DAILY
Qty: 30 TABLET | Refills: 0 | Status: SHIPPED | OUTPATIENT
Start: 2021-01-30 | End: 2021-02-14

## 2021-01-30 ASSESSMENT — PAIN DESCRIPTION - ORIENTATION: ORIENTATION: LEFT

## 2021-01-30 ASSESSMENT — PAIN DESCRIPTION - LOCATION: LOCATION: EAR

## 2021-01-30 ASSESSMENT — PAIN SCALES - GENERAL
PAINLEVEL_OUTOF10: 4
PAINLEVEL_OUTOF10: 4

## 2021-01-30 NOTE — ED PROVIDER NOTES
[address]      Pt Name: Pedro Tamayo  MRN: [id number]  [place] 1970  Date of evaluation: 1/30/2021  Provider: Jazlyn Morris MD    [address] [place] [address]       Chief Complaint   Patient presents with    Otalgia     left ear pain x 1 week    Hearing Problem     loss of hearing in left ear for a week         HISTORY OF PRESENT ILLNESS   (Location/Symptom, Timing/Onset,Context/Setting, Quality, Duration, Modifying Factors, Severity)  Note limiting factors. Pedro Tamayo is a 52 y.o. male who presents to the emergency department for evaluation of left ear pressure and difficulty hearing. Patient reports that symptoms been ongoing for about the past week. He states that his hearing is typically normal in the morning, but then around 10 AM he will have muffled hearing in the left ear, occasionally associated with ringing. He does report sinus congestion over this period as well. He has been taking Zyrtec-D without significant improvement in his symptoms. He denies any fever. He reports he has had a rash to the left neck for about the past 6 months which is unchanged since onset of the ear symptoms. He does have an appointment with dermatology on 2/5/2021 regarding this rash. He denies any changes to the rash or spread since onset of the ear symptoms. Denies any throat pain, difficulty breathing or swallowing. Denies any recent fevers. NursingNotes were reviewed. REVIEW OF SYSTEMS    (2-9 systems for level 4, 10 or more for level 5)       Constitutional: No fever or chills. Eye: No visual disturbances. No eye pain. Ear/Nose/Mouth/Throat: Nasal congestion. No sore throat. Left ear pain and muffled hearing. Respiratory: No cough, No shortness of breath, No sputum production. Cardiovascular: No chest pain. No palpitations. Gastrointestinal: No abdominal pain. No nausea or vomiting  Immunologic: No malaise. No swollen glands. Musculoskeletal: No back pain. No joint pain. Integumentary: Chronic rash to left neck. No abrasions. Neurologic: No headache. No focal numbness or weakness. PAST MEDICAL HISTORY     Past Medical History:   Diagnosis Date    Ankylosing spondylitis ([facility] [address]     Fatty liver          SURGICALHISTORY       Past Surgical History:   Procedure Laterality Date    HERNIA REPAIR Right 2000         CURRENT MEDICATIONS       Previous Medications    ALBUTEROL (PROVENTIL) (2.5 MG/3ML) 0.083% NEBULIZER SOLUTION    Take 2.5 mg by nebulization every 6 hours as needed for Wheezing    ALBUTEROL SULFATE (PROAIR RESPICLICK) 928 (90 BASE) MCG/ACT AEROSOL POWDER INHALATION    Inhale into the lungs every 4 hours as needed for Wheezing or Shortness of Breath    BUDESONIDE-FORMOTEROL (SYMBICORT) 160-4.5 MCG/ACT AERO    Inhale 2 puffs into the lungs 2 times daily    ESOMEPRAZOLE MAGNESIUM (NEXIUM) 40 MG PACK    Take 40 mg by mouth 2 times daily    NORTRIPTYLINE (PAMELOR) 10 MG CAPSULE    Take 10 mg by mouth nightly        ALLERGIES     Pcn [penicillins]    FAMILY HISTORY       Family History   Problem Relation Age of Onset    Liver Disease Mother              Atrial Fibrillation Father     Diabetes type 2  Father     Arthritis Father           SOCIAL HISTORY       Social History     Socioeconomic History    Marital status: Single     Spouse name: Not on file    Number of children: Not on file    Years of education: Not on file    Highest education level: Not on file   Occupational History    Not on file   Social Needs    Financial resource strain: Not on file    Food insecurity     Worry: Not on file     Inability: Not on file    Transportation needs     Medical: Not on file     Non-medical: Not on file   Tobacco Use    Smoking status: Never Smoker    Smokeless tobacco: Never Used   Substance and Sexual Activity    Alcohol use: Not Currently     Alcohol/week: 0.0 standard drinks     Comment:      Drug use:  No  Sexual activity: Not Currently   Lifestyle    Physical activity     Days per week: Not on file     Minutes per session: Not on file    Stress: Not on file   Relationships    Social connections     Talks on phone: Not on file     Gets together: Not on file     Attends Judaism service: Not on file     Active member of club or organization: Not on file     Attends meetings of clubs or organizations: Not on file     Relationship status: Not on file    Intimate partner violence     Fear of current or ex partner: Not on file     Emotionally abused: Not on file     Physically abused: Not on file     Forced sexual activity: Not on file   Other Topics Concern    Not on file   Social History Narrative    Not on file       SCREENINGS             PHYSICAL EXAM    (up to 7 for level 4, 8 or more for level 5)     ED Triage Vitals   BP Temp Temp src Pulse Resp SpO2 Height Weight   -- -- -- -- -- -- -- --       General: Alert and oriented, No acute distress. Eye: Normal conjunctiva. Pupils equal and reactive. HENT: Oral mucosa is moist.  Right TM appears normal.  Left TM with clear fluid behind the membrane, normal light reflex, there is a small amount of erythema along the superior aspect of the TM but no perforation is noted. Auditory canals appear normal bilaterally. No mastoid or TMJ tenderness bilaterally. Oropharynx appears normal.  Respiratory: Lungs are clear to auscultation, Respirations are non-labored. Cardiovascular: Normal rate, Regular rhythm. Gastrointestinal: Soft, Non-tender, Non-distended. Musculoskeletal: No swelling. Integumentary: Warm, Dry. Macular rash to the left neck which patient ports is unchanged for the past 6 months. Neurologic: Alert, Oriented, No focal defects. Psychiatric: Cooperative.     DIAGNOSTIC RESULTS     EKG: All EKG's are interpreted by the Emergency Department Physician who either signs or Co-signsthis chart in the absence of a cardiologist.      RADIOLOGY: Non-plain filmimages such as CT, Ultrasound and MRI are read by the radiologist. Plain radiographic images are visualized and preliminarily interpreted by the emergency physician with the below findings:      Interpretation per the Radiologist below, if available at the time ofthis note:    No orders to display         ED BEDSIDE ULTRASOUND:   Performed by ED Physician - none    LABS:  Labs Reviewed - No data to display    All other labs were within normal range or not returned as of this dictation.     EMERGENCY DEPARTMENT COURSE and DIFFERENTIAL DIAGNOSIS/MDM:   Vitals:    Vitals:    01/30/21 1505   BP: (!) 157/108   Pulse: 95   Resp: 16   Temp: 98.4 °F (36.9 °C)   TempSrc: Oral   SpO2: 97%   Weight: 258 lb 2.5 oz (117.1 kg)   Height: 6' 2\" (1.88 m)         Medical decision making: This is a 68-year-old male who presents for evaluation of 1 week of left ear pressure associated with muffled hearing occasionally ringing sensation. Patient does report sinus congestion during this period as well. Patient also reports has had a left-sided neck rash for about the past 6 months which is unchanged this past week and he already has appropriate follow-up with dermatology scheduled. On exam he is well-appearing, afebrile, with normal vital signs. Bilateral auditory canals appear normal without significant cerumen. The right TM is normal, left TM has clear fluid behind the membrane, but it is not bulging or red to suggest acute infection. No associated mastoid or TMJ tenderness. Patient is counseled that muffled hearing is likely secondary to fluid buildup behind the ear related to his sinus congestion, or serous otitis media. Recommended continued Zyrtec-D in addition to Mucinex. Patient is given contact information to follow-up with ENT if symptoms not resolved in 1 week. Recommended to return to the emergency department with new or worsening symptoms. Patient is understanding, agreeable with plan of care, discharged in stable condition. CRITICAL CARE TIME   Total Critical Care time was 0 minutes, excluding separately reportable procedures. There was a high probability of clinically significant/life threatening deterioration in the patient's condition which required my urgent intervention. CONSULTS:  None    PROCEDURES:  Unless otherwise noted below, none         FINAL IMPRESSION      1.  Non-recurrent acute serous otitis media of left ear          DISPOSITION/PLAN   DISPOSITION Decision To Discharge 01/30/2021 03:13:30 PM      PATIENT REFERRED TO:  Joselito Dowling, [address] [facility] [address] [address]  [phone]    Schedule an appointment as soon as possible for a visit in 1 week  If not improved      DISCHARGE MEDICATIONS:  New Prescriptions GUAIFENESIN (MUCINEX) 600 MG EXTENDED RELEASE TABLET    Take 1 tablet by mouth 2 times daily for 15 days          (Please note that portions of this note were completed with a voice recognition program.Efforts were made to edit the dictations but occasionally words are mis-transcribed.)    Virginia Connolly MD (electronically signed)  Attending Emergency Physician       Virginia Connolly MD  01/30/21 2501

## 2021-01-30 NOTE — ED TRIAGE NOTES
Pt. C/o left ear pain and loss of hearing onset a week ago, denies drainage from ear, has had lots of sinus drainage.

## 2021-02-02 ENCOUNTER — TELEPHONE (OUTPATIENT)
Dept: INTERNAL MEDICINE CLINIC | Age: 51
End: 2021-02-02

## 2021-02-02 NOTE — TELEPHONE ENCOUNTER
Pt. Is still having the same symptoms of breathing of having trouble taking deep breathes and side pain. Patient is also having left ear hearing issues and ringing in his ear. He went to er 1/30/21 for this. He is also still having stomach issues and irritation. He stated the mucinex they recommened is upsetting his stomach.      He isn't sure if he should come in or be referred to specialist.

## 2021-02-03 NOTE — TELEPHONE ENCOUNTER
Called pt to advise.  He can be seen on 02/16 in one of the blocked slots [Spouse] : spouse [FreeTextEntry1] : Patient presents for CPE today.\par  [de-identified] : Patient has been in good overall health this past year.\par She sold her house several months ago (during the pandemic) and downsized to a home in the [facility] in [place].  She is happy with the move and will be closer to her kids.\par Her daughter-in-law is expecting their 1st grandchild 11/2020 (a baby girl)--pt had Tdap 2 years ago.\par \par She has h/o breast cancer-she had her final visit with her oncologist Dr. Adela Johnson in 4/2020.  She stopped her Anastrazole (after 7 years) and she also stopped her Fosamax.  \par She will continue to see her breast surgeon Dr. Migue Hammer yearly-last appt was 11/2019.\par \par She gets f/u MRI of her breasts q 3 years-ordered by her breast surgeon  Dr. Harman Camarena for her h/o breast cancer.\par MRI from 8/01/2019 revealed evidence of a 0.8 cm lesion in the sternum+ new.  Pt underwent a f/u bone scan that was negative.\par \par She saw Dr. Shaw, her endocrinologist for her osteopenia --she stopped treatment with Fosamax when she stopped Arimidex.\par \par Her persistent/chronic cough has resolved--no longer sees Dr. Martinez.

## 2021-02-19 ENCOUNTER — OFFICE VISIT (OUTPATIENT)
Dept: INTERNAL MEDICINE CLINIC | Age: 51
End: 2021-02-19
Payer: COMMERCIAL

## 2021-02-19 VITALS
OXYGEN SATURATION: 98 % | HEART RATE: 98 BPM | TEMPERATURE: 96.4 F | WEIGHT: 259.8 LBS | SYSTOLIC BLOOD PRESSURE: 138 MMHG | DIASTOLIC BLOOD PRESSURE: 82 MMHG | BODY MASS INDEX: 33.36 KG/M2

## 2021-02-19 DIAGNOSIS — H93.12 TINNITUS OF LEFT EAR: ICD-10-CM

## 2021-02-19 DIAGNOSIS — K29.00 ACUTE GASTRITIS WITHOUT HEMORRHAGE, UNSPECIFIED GASTRITIS TYPE: ICD-10-CM

## 2021-02-19 DIAGNOSIS — H69.82 DYSFUNCTION OF LEFT EUSTACHIAN TUBE: Primary | ICD-10-CM

## 2021-02-19 DIAGNOSIS — R10.11 RUQ PAIN: ICD-10-CM

## 2021-02-19 PROCEDURE — 99214 OFFICE O/P EST MOD 30 MIN: CPT | Performed by: NURSE PRACTITIONER

## 2021-02-19 RX ORDER — FLUTICASONE PROPIONATE 50 MCG
1 SPRAY, SUSPENSION (ML) NASAL DAILY
Qty: 1 BOTTLE | Refills: 0 | Status: SHIPPED | OUTPATIENT
Start: 2021-02-19 | End: 2021-02-19 | Stop reason: SDUPTHER

## 2021-02-19 RX ORDER — ESOMEPRAZOLE MAGNESIUM 40 MG/1
40 FOR SUSPENSION ORAL 2 TIMES DAILY
Qty: 30 PACKET | Refills: 3 | Status: CANCELLED | OUTPATIENT
Start: 2021-02-19

## 2021-02-19 RX ORDER — IBUPROFEN 200 MG
200 TABLET ORAL EVERY 6 HOURS PRN
COMMUNITY
End: 2021-11-16

## 2021-02-19 RX ORDER — FLUTICASONE PROPIONATE 50 MCG
1 SPRAY, SUSPENSION (ML) NASAL DAILY
Qty: 1 BOTTLE | Refills: 0 | Status: SHIPPED | OUTPATIENT
Start: 2021-02-19 | End: 2021-11-16

## 2021-02-19 RX ORDER — METHYLPREDNISOLONE 4 MG/1
TABLET ORAL
Qty: 1 KIT | Refills: 0 | Status: SHIPPED | OUTPATIENT
Start: 2021-02-19 | End: 2021-11-16 | Stop reason: ALTCHOICE

## 2021-02-19 RX ORDER — METHYLPREDNISOLONE 4 MG/1
TABLET ORAL
Qty: 1 KIT | Refills: 0 | Status: SHIPPED | OUTPATIENT
Start: 2021-02-19 | End: 2021-02-19 | Stop reason: SDUPTHER

## 2021-02-19 RX ORDER — NORTRIPTYLINE HYDROCHLORIDE 10 MG/1
10 CAPSULE ORAL NIGHTLY
Qty: 90 CAPSULE | Refills: 1 | Status: SHIPPED | OUTPATIENT
Start: 2021-02-19 | End: 2021-11-16

## 2021-02-19 ASSESSMENT — PATIENT HEALTH QUESTIONNAIRE - PHQ9
SUM OF ALL RESPONSES TO PHQ9 QUESTIONS 1 & 2: 0
SUM OF ALL RESPONSES TO PHQ QUESTIONS 1-9: 0
2. FEELING DOWN, DEPRESSED OR HOPELESS: 0

## 2021-02-19 NOTE — PROGRESS NOTES
2/19/21     Chief Complaint   Patient presents with    Tinnitus     left ear, started the end of jan, given Clarithromycin 500 mg daily but didn't help, told he had pressure    Breathing Problem     hard time taking deep breaths, gets pain on his right side sometimes after taking medication or eating certain foods, gets random sore throats     HPI     End of Jan started with left ear pressure and sinus drainage. Treated for otitis media with clarithromycin at the end of jan - symptoms are unchanged. Having left ear pressure and ringing that has not resolved since abx. Hearing in left hear comes and goes -ringing worse in a quiet room. Taking zyrtec - not helping   Using nasal spray - Afrin (reports has used for years)     RUQ pain after eating certain foods. Started in June. Comes and goes   Gets sore throat from pain and GERD  Worse with greasy and spicy foods  Seeing Dr. Meena Causey has gallbladder - had a normal HIDA. had EGD x2 and colonoscopy with GI - inflammation and started on nexium. EGD noted gastritis. started on high dose nexium which is helping significantly. Is avoiding nsaids. Had liver US complete with GI - fatty liver   Has not seen GI in follow up recently. Reports some symptoms are improving. Allergies   Allergen Reactions    Pcn [Penicillins] Hives     Current Outpatient Medications   Medication Sig Dispense Refill    ibuprofen (ADVIL;MOTRIN) 200 MG tablet Take 200 mg by mouth every 6 hours as needed for Pain      nortriptyline (PAMELOR) 10 MG capsule Take 1 capsule by mouth nightly 90 capsule 1    methylPREDNISolone (MEDROL DOSEPACK) 4 MG tablet Take by mouth.  1 kit 0    fluticasone (FLONASE) 50 MCG/ACT nasal spray 1 spray by Each Nostril route daily 1 Bottle 0    albuterol (PROVENTIL) (2.5 MG/3ML) 0.083% nebulizer solution Take 2.5 mg by nebulization every 6 hours as needed for Wheezing  albuterol sulfate (PROAIR RESPICLICK) 864 (90 Base) MCG/ACT aerosol powder inhalation Inhale into the lungs every 4 hours as needed for Wheezing or Shortness of Breath      budesonide-formoterol (SYMBICORT) 160-4.5 MCG/ACT AERO Inhale 2 puffs into the lungs 2 times daily      esomeprazole Magnesium (NEXIUM) 40 MG PACK Take 40 mg by mouth 2 times daily       No current facility-administered medications for this visit. Review of Systems  Negative other than HPI    Vitals:    02/19/21 1515   BP: 138/82   Pulse: 98   Temp: 96.4 °F (35.8 °C)   SpO2: 98%   Weight: 259 lb 12.8 oz (117.8 kg)      Physical Exam  Vitals signs reviewed. Constitutional:       General: He is not in acute distress. Appearance: He is well-developed. He is not ill-appearing or diaphoretic. HENT:      Head: Normocephalic and atraumatic. Right Ear: Ear canal normal. No drainage, swelling or tenderness. No middle ear effusion. Tympanic membrane is not perforated, erythematous or retracted. Left Ear: Ear canal normal. No drainage, swelling or tenderness. A middle ear effusion is present. Tympanic membrane is not perforated, erythematous or retracted. Ears:      Comments: Bilateral TM dull. Eyes:      Extraocular Movements: Extraocular movements intact. Pupils: Pupils are equal, round, and reactive to light. Cardiovascular:      Rate and Rhythm: Normal rate and regular rhythm. Heart sounds: Normal heart sounds. No murmur. Pulmonary:      Effort: Pulmonary effort is normal. No respiratory distress. Breath sounds: Normal breath sounds. No wheezing or rhonchi. Abdominal:      General: Bowel sounds are normal. There is no distension. Palpations: Abdomen is soft. Tenderness: There is no abdominal tenderness. Skin:     General: Skin is warm and dry. Neurological:      General: No focal deficit present. Mental Status: He is alert and oriented to person, place, and time. Psychiatric:         Mood and Affect: Mood and affect normal.         Behavior: Behavior normal.       Assessment/Plan:  Dysfunction of left eustachian tube/ Tinnitus of left ear  Stable, uncontrolled  Covering with steroids  If symptoms are not improving - see audiology   - [facility], Audiology, [facility]  - methylPREDNISolone (MEDROL DOSEPACK) 4 MG tablet; Take by mouth. Dispense: 1 kit; Refill: 0  - fluticasone (FLONASE) 50 MCG/ACT nasal spray; 1 spray by Each Nostril route daily  Dispense: 1 Bottle; Refill: 0    RUQ pain / gastritis   Stable, on nexium and reports some symptoms improving   Gallbladder work-up is normal  We discussed options in detail  Patient follow-up with Dr. Matthew Perdue  We discussed referral to surgery for possible flavia -declined today    Discussed medications with patient, who voiced understanding of their use and indications. All questions answered.     Reviewed follow-up criteria    Electronically signed by SURESH Ferrell CNP on 2/19/2021 at 4:18 PM

## 2021-03-16 LAB
A/G RATIO: 1.5 (ref 1.1–2.2)
ALBUMIN SERPL-MCNC: 4.6 G/DL (ref 3.4–5)
ALP BLD-CCNC: 108 U/L (ref 40–129)
ALT SERPL-CCNC: 36 U/L (ref 10–40)
ANION GAP SERPL CALCULATED.3IONS-SCNC: 15 MMOL/L (ref 3–16)
AST SERPL-CCNC: 24 U/L (ref 15–37)
BASOPHILS ABSOLUTE: 0.1 K/UL (ref 0–0.2)
BASOPHILS RELATIVE PERCENT: 1.1 %
BILIRUB SERPL-MCNC: 0.4 MG/DL (ref 0–1)
BUN BLDV-MCNC: 10 MG/DL (ref 7–20)
CALCIUM SERPL-MCNC: 9.3 MG/DL (ref 8.3–10.6)
CHLORIDE BLD-SCNC: 101 MMOL/L (ref 99–110)
CO2: 25 MMOL/L (ref 21–32)
CREAT SERPL-MCNC: 0.8 MG/DL (ref 0.9–1.3)
EOSINOPHILS ABSOLUTE: 0.1 K/UL (ref 0–0.6)
EOSINOPHILS RELATIVE PERCENT: 1.9 %
GFR AFRICAN AMERICAN: >60
GFR NON-AFRICAN AMERICAN: >60
GLOBULIN: 3 G/DL
GLUCOSE BLD-MCNC: 102 MG/DL (ref 70–99)
HCT VFR BLD CALC: 48.6 % (ref 40.5–52.5)
HEMOGLOBIN: 16.2 G/DL (ref 13.5–17.5)
LIPASE: 38 U/L (ref 13–60)
LYMPHOCYTES ABSOLUTE: 2 K/UL (ref 1–5.1)
LYMPHOCYTES RELATIVE PERCENT: 28.7 %
MCH RBC QN AUTO: 28.5 PG (ref 26–34)
MCHC RBC AUTO-ENTMCNC: 33.3 G/DL (ref 31–36)
MCV RBC AUTO: 85.7 FL (ref 80–100)
MONOCYTES ABSOLUTE: 0.5 K/UL (ref 0–1.3)
MONOCYTES RELATIVE PERCENT: 7.2 %
NEUTROPHILS ABSOLUTE: 4.3 K/UL (ref 1.7–7.7)
NEUTROPHILS RELATIVE PERCENT: 61.1 %
PDW BLD-RTO: 13.5 % (ref 12.4–15.4)
PLATELET # BLD: 318 K/UL (ref 135–450)
PMV BLD AUTO: 8.1 FL (ref 5–10.5)
POTASSIUM SERPL-SCNC: 4.1 MMOL/L (ref 3.5–5.1)
RBC # BLD: 5.68 M/UL (ref 4.2–5.9)
SODIUM BLD-SCNC: 141 MMOL/L (ref 136–145)
TOTAL PROTEIN: 7.6 G/DL (ref 6.4–8.2)
WBC # BLD: 7.1 K/UL (ref 4–11)

## 2021-03-17 LAB
BILIRUBIN URINE: NEGATIVE
BLOOD, URINE: NEGATIVE
CLARITY: CLEAR
COLOR: YELLOW
GLUCOSE URINE: NEGATIVE MG/DL
KETONES, URINE: NEGATIVE MG/DL
LEUKOCYTE ESTERASE, URINE: NEGATIVE
MICROSCOPIC EXAMINATION: NORMAL
NITRITE, URINE: NEGATIVE
PH UA: 6 (ref 5–8)
PROTEIN UA: NEGATIVE MG/DL
SEDIMENTATION RATE, ERYTHROCYTE: 4 MM/HR (ref 0–15)
SPECIFIC GRAVITY UA: 1.01 (ref 1–1.03)
URINE TYPE: NORMAL
UROBILINOGEN, URINE: 0.2 E.U./DL

## 2021-03-22 ENCOUNTER — OFFICE VISIT (OUTPATIENT)
Dept: PULMONOLOGY | Age: 51
End: 2021-03-22
Payer: COMMERCIAL

## 2021-03-22 VITALS
OXYGEN SATURATION: 98 % | DIASTOLIC BLOOD PRESSURE: 86 MMHG | BODY MASS INDEX: 32.6 KG/M2 | SYSTOLIC BLOOD PRESSURE: 138 MMHG | HEART RATE: 97 BPM | WEIGHT: 254 LBS | HEIGHT: 74 IN | TEMPERATURE: 97.8 F

## 2021-03-22 DIAGNOSIS — G47.33 OSA (OBSTRUCTIVE SLEEP APNEA): ICD-10-CM

## 2021-03-22 DIAGNOSIS — R06.02 SOB (SHORTNESS OF BREATH): Primary | ICD-10-CM

## 2021-03-22 PROCEDURE — 99214 OFFICE O/P EST MOD 30 MIN: CPT | Performed by: INTERNAL MEDICINE

## 2021-03-22 ASSESSMENT — SLEEP AND FATIGUE QUESTIONNAIRES
HOW LIKELY ARE YOU TO NOD OFF OR FALL ASLEEP WHEN YOU ARE A PASSENGER IN A CAR FOR AN HOUR WITHOUT A BREAK: 1
ESS TOTAL SCORE: 9
HOW LIKELY ARE YOU TO NOD OFF OR FALL ASLEEP WHILE LYING DOWN TO REST IN THE AFTERNOON WHEN CIRCUMSTANCES PERMIT: 2
HOW LIKELY ARE YOU TO NOD OFF OR FALL ASLEEP WHILE SITTING AND READING: 2

## 2021-03-22 NOTE — PROGRESS NOTES
[facility] FOR CONSULTATION/CC:  shortness of breath   Chief Complaint   Patient presents with    Sleep Apnea        Consult at request of   Winifred Hernandez MD for sob     PCP: Winifred Hernandez MD    HISTORY OF PRESENT ILLNESS: Akanksha Montejo is a 52y.o. year old male with a history of ankylosis spondylitis who presents          shortness of breath  He continues to have shortness of breath. No improvement with inhaler medications. Work-up has been negative. He continues to believe that he is possibly a long-haul or from COVID-19 without any evidence to support this. No findings on chest x-ray. He has received 1 out of 2 COVID-19 vaccinations without side effect. Ankylosing spondylitis  Follow with rheumatology. SOURAV  Using CPAP nightly. No issues. REVIEW OF SYSTEMS:  Constitutional: Negative for fever    HENT: Negative for sore throat  Eyes: Negative for redness   Respiratory: ++ for dyspnea, - cough  Cardiovascular: Negative for chest pain  Gastrointestinal: Negative for vomiting, diarrhea   Genitourinary: Negative for hematuria   Musculoskeletal: Negative for arthralgias   Skin: Negative for rash  Neurological: Negative for syncope  Hematological: Negative for adenopathy  Psychiatric/Behavorial: Negative for anxiety    Objective:   PHYSICAL EXAM:  Blood pressure 138/86, pulse 97, temperature 97.8 °F (36.6 °C), height 6' 2\" (1.88 m), weight 254 lb (115.2 kg), SpO2 98 %.'  Gen: No distress. Body mass index is 32.61 kg/m². Eyes: PERRL. No sclera icterus. No conjunctival injection. ENT: No discharge. Pharynx clear. External appearance of ears and nose normal. Mallampati  3  Neck: Trachea midline. No obvious mass. Resp: No accessory muscle use. No crackles. No wheezes. No rhonchi. CV: Regular rate. Regular rhythm. No murmur or rub. No edema. GI: Non-tender. Non-distended. No hernia. Skin: Warm, dry, normal texture and turgor. No nodule on exposed extremities. Erythema of neck. Lymph: No cervical LAD. No supraclavicular LAD. M/S: No cyanosis. No clubbing. No joint deformity. Neuro: Moves all four extremities. Psych: Oriented x 3. No anxiety. Awake. Alert. Intact judgement and insight. .      Current Outpatient Medications   Medication Sig Dispense Refill    fluticasone-umeclidin-vilant (TRELEGY ELLIPTA) 100-62.5-25 MCG/INH AEPB Inhale 1 puff into the lungs daily 1 each 0    ibuprofen (ADVIL;MOTRIN) 200 MG tablet Take 200 mg by mouth every 6 hours as needed for Pain      nortriptyline (PAMELOR) 10 MG capsule Take 1 capsule by mouth nightly 90 capsule 1    methylPREDNISolone (MEDROL DOSEPACK) 4 MG tablet Take by mouth. 1 kit 0    fluticasone (FLONASE) 50 MCG/ACT nasal spray 1 spray by Each Nostril route daily 1 Bottle 0    albuterol (PROVENTIL) (2.5 MG/3ML) 0.083% nebulizer solution Take 2.5 mg by nebulization every 6 hours as needed for Wheezing      albuterol sulfate (PROAIR RESPICLICK) 989 (90 Base) MCG/ACT aerosol powder inhalation Inhale into the lungs every 4 hours as needed for Wheezing or Shortness of Breath      budesonide-formoterol (SYMBICORT) 160-4.5 MCG/ACT AERO Inhale 2 puffs into the lungs 2 times daily      esomeprazole Magnesium (NEXIUM) 40 MG PACK Take 40 mg by mouth 2 times daily       No current facility-administered medications for this visit. Data Reviewed:        Assessment:     · Self diagnosed asthma  · Alpha-1 antitrypsin deficiency  · Fatty liver  · Ankylosing spondylitis  · Hypersomnia with strong family history of sleep apnea  · Rash  · Shortness of breath      Plan:      Problem List Items Addressed This Visit     SOB (shortness of breath) - Primary     At this point, I do not have a clear etiology for his symptoms of shortness of breath. He did not respond to Symbicort or albuterol.   Trial of Trelegy but this is unlikely to be beneficial.  When he is received steroids in the past, this has not improved his symptoms of fatigue/shortness of breath. Normal pulmonary function test and a CT chest.  Inflammatory markers have been low in the past.  No signs of polymyalgia rheumatica. Currently being treated for ankylosing spondylitis. No further work-up at this time. Relevant Medications    fluticasone-umeclidin-vilant (TRELEGY ELLIPTA) 100-62.5-25 MCG/INH AEPB    SOURAV (obstructive sleep apnea)     Using CPAP nightly. No change in current therapy. This note was transcribed using [id number] [employer]. Please disregard any translational errors.     Edgardo Ayala [facility], [phone]

## 2021-03-23 RX ORDER — FLUTICASONE FUROATE, UMECLIDINIUM BROMIDE AND VILANTEROL TRIFENATATE 100; 62.5; 25 UG/1; UG/1; UG/1
1 POWDER RESPIRATORY (INHALATION) DAILY
Qty: 1 EACH | Refills: 0 | COMMUNITY
Start: 2021-03-23 | End: 2021-11-16

## 2021-03-23 NOTE — ASSESSMENT & PLAN NOTE
At this point, I do not have a clear etiology for his symptoms of shortness of breath. He did not respond to Symbicort or albuterol. Trial of Trelegy but this is unlikely to be beneficial.  When he is received steroids in the past, this has not improved his symptoms of fatigue/shortness of breath. Normal pulmonary function test and a CT chest.  Inflammatory markers have been low in the past.  No signs of polymyalgia rheumatica. Currently being treated for ankylosing spondylitis. No further work-up at this time.

## 2021-04-07 ENCOUNTER — HOSPITAL ENCOUNTER (OUTPATIENT)
Dept: CT IMAGING | Age: 51
Discharge: HOME | End: 2021-04-07
Payer: COMMERCIAL

## 2021-04-07 DIAGNOSIS — M54.50 LOW BACK PAIN, UNSPECIFIED BACK PAIN LATERALITY, UNSPECIFIED CHRONICITY, UNSPECIFIED WHETHER SCIATICA PRESENT: ICD-10-CM

## 2021-04-07 PROCEDURE — 74176 CT ABD & PELVIS W/O CONTRAST: CPT

## 2021-11-16 ENCOUNTER — OFFICE VISIT (OUTPATIENT)
Dept: INTERNAL MEDICINE CLINIC | Age: 51
End: 2021-11-16
Payer: COMMERCIAL

## 2021-11-16 ENCOUNTER — TELEPHONE (OUTPATIENT)
Dept: INTERNAL MEDICINE CLINIC | Age: 51
End: 2021-11-16

## 2021-11-16 VITALS
HEART RATE: 88 BPM | HEIGHT: 74 IN | DIASTOLIC BLOOD PRESSURE: 88 MMHG | SYSTOLIC BLOOD PRESSURE: 136 MMHG | WEIGHT: 245 LBS | BODY MASS INDEX: 31.44 KG/M2

## 2021-11-16 DIAGNOSIS — Z13.220 LIPID SCREENING: ICD-10-CM

## 2021-11-16 DIAGNOSIS — R10.9 CHRONIC ABDOMINAL PAIN: Primary | ICD-10-CM

## 2021-11-16 DIAGNOSIS — G89.29 CHRONIC ABDOMINAL PAIN: Primary | ICD-10-CM

## 2021-11-16 DIAGNOSIS — Z13.1 DIABETES MELLITUS SCREENING: ICD-10-CM

## 2021-11-16 PROCEDURE — 99214 OFFICE O/P EST MOD 30 MIN: CPT | Performed by: INTERNAL MEDICINE

## 2021-11-16 RX ORDER — SERTRALINE HYDROCHLORIDE 25 MG/1
25 TABLET, FILM COATED ORAL DAILY
Qty: 30 TABLET | Refills: 1 | Status: SHIPPED | OUTPATIENT
Start: 2021-11-16 | End: 2021-12-29 | Stop reason: SDUPTHER

## 2021-11-16 RX ORDER — ACETAMINOPHEN, ASPIRIN AND CAFFEINE 250; 250; 65 MG/1; MG/1; MG/1
1 TABLET, FILM COATED ORAL EVERY 6 HOURS PRN
COMMUNITY
End: 2022-04-11

## 2021-11-16 RX ORDER — CETIRIZINE HYDROCHLORIDE, PSEUDOEPHEDRINE HYDROCHLORIDE 5; 120 MG/1; MG/1
1 TABLET, FILM COATED, EXTENDED RELEASE ORAL DAILY
COMMUNITY
End: 2021-12-29

## 2021-11-16 SDOH — ECONOMIC STABILITY: FOOD INSECURITY: WITHIN THE PAST 12 MONTHS, THE FOOD YOU BOUGHT JUST DIDN'T LAST AND YOU DIDN'T HAVE MONEY TO GET MORE.: NEVER TRUE

## 2021-11-16 SDOH — ECONOMIC STABILITY: FOOD INSECURITY: WITHIN THE PAST 12 MONTHS, YOU WORRIED THAT YOUR FOOD WOULD RUN OUT BEFORE YOU GOT MONEY TO BUY MORE.: NEVER TRUE

## 2021-11-16 ASSESSMENT — SOCIAL DETERMINANTS OF HEALTH (SDOH): HOW HARD IS IT FOR YOU TO PAY FOR THE VERY BASICS LIKE FOOD, HOUSING, MEDICAL CARE, AND HEATING?: NOT HARD AT ALL

## 2021-11-16 NOTE — TELEPHONE ENCOUNTER
----- Message from Zeeshan Pool sent at 11/16/2021  9:25 AM EST -----  Subject: Message to Provider    QUESTIONS  Information for Provider? Pt is still having problems with Breathing,   Right stomach & back pain. Light colored stool. Random Inflammation in   lips & toe swelling. Irritated stomach & gas.  ---------------------------------------------------------------------------  --------------  CALL BACK INFO  What is the best way for the office to contact you? OK to leave message on   voicemail  Preferred Call Back Phone Number? [phone]  ---------------------------------------------------------------------------  --------------  SCRIPT ANSWERS  Relationship to Patient?  Self

## 2021-11-16 NOTE — PROGRESS NOTES
Chief Complaint   Patient presents with    Abdominal Pain     reports having abodminal pain in lower abdomen and rt side x 1 yr but has not gone away. pain will then radiate back towards the left side     Oral Swelling     has random inflammation in his lip. last episode was last friday. also swelling in his eye and toe.  Rash     Pt reports having white itchy/tingling patches. mainly appear on hands and face. HPI:   The patient is presenting for a constellation of chronic symptoms which have been present for about the past year and a half without significant change. Symptoms include abdominal pain usually in the upper abdomen aggravated by eating or taking medication. He stopped all of his medication about a week ago and his symptoms have been improving. He also complains of a paretic rash located on his hands. He also complains of intermittent swelling involving his face, lips, and toes. He is not sure what provokes his symptoms but he thinks that taking medication makes them worse. Since the onset of symptoms he has had extensive testing including CT of the chest, CT of the abdomen and pelvis, colonoscopy, EGD x2, numerous blood test.  Testing has not revealed a cause of his symptoms. He has also been evaluated by rheumatology, pulmonology, gastroenterology, and myself. He does not use tobacco, alcohol, or illicit substances. He has tried numerous adjustments to his diet including avoidance of gluten without relief. EXAM:  /88   Pulse 88   Ht 6' 2\" (1.88 m)   Wt 245 lb (111.1 kg)   BMI 31.46 kg/m²    Gen: WN/WD, no acute distress  Head: normocephalic, atraumatic  Eyes: no icterus, no conjunctival erythema. Pupils reactive to light. CV: regular rate and rhythm, no murmurs, rubs, gallops. No peripheral edema  Resp: Normal effort, clear to auscultation bilaterally  Abd: +Bowel Sounds, soft, non tender to palpation.  No palpable masses   MSK: no joint swelling, no cyanosis or

## 2021-11-16 NOTE — TELEPHONE ENCOUNTER
----- Message from [employer] sent at 11/16/2021  9:27 AM EST -----  Subject: Message to Provider    QUESTIONS  Information for Provider? Pt is also having irritated skin with white   patch's of hives.  ---------------------------------------------------------------------------  --------------  CALL BACK INFO  What is the best way for the office to contact you? OK to leave message on   voicemail  Preferred Call Back Phone Number? [phone]  ---------------------------------------------------------------------------  --------------  SCRIPT ANSWERS  Relationship to Patient?  Self

## 2021-11-22 DIAGNOSIS — R10.9 CHRONIC ABDOMINAL PAIN: ICD-10-CM

## 2021-11-22 DIAGNOSIS — G89.29 CHRONIC ABDOMINAL PAIN: ICD-10-CM

## 2021-11-22 DIAGNOSIS — Z13.1 DIABETES MELLITUS SCREENING: ICD-10-CM

## 2021-11-22 DIAGNOSIS — Z13.220 LIPID SCREENING: ICD-10-CM

## 2021-11-22 LAB
CHOLESTEROL, TOTAL: 157 MG/DL (ref 0–199)
GLUCOSE FASTING: 102 MG/DL (ref 70–99)
HDLC SERPL-MCNC: 51 MG/DL (ref 40–60)
IGA: 262 MG/DL (ref 70–400)
LDL CHOLESTEROL CALCULATED: 95 MG/DL
TRIGL SERPL-MCNC: 56 MG/DL (ref 0–150)
VLDLC SERPL CALC-MCNC: 11 MG/DL

## 2021-11-23 LAB — TISSUE TRANSGLUTAMINASE IGA: <0.5 U/ML (ref 0–14)

## 2021-12-29 ENCOUNTER — OFFICE VISIT (OUTPATIENT)
Dept: INTERNAL MEDICINE CLINIC | Age: 51
End: 2021-12-29
Payer: COMMERCIAL

## 2021-12-29 VITALS
HEART RATE: 98 BPM | OXYGEN SATURATION: 97 % | BODY MASS INDEX: 31.46 KG/M2 | WEIGHT: 245 LBS | SYSTOLIC BLOOD PRESSURE: 168 MMHG | DIASTOLIC BLOOD PRESSURE: 80 MMHG

## 2021-12-29 DIAGNOSIS — I10 PRIMARY HYPERTENSION: ICD-10-CM

## 2021-12-29 DIAGNOSIS — F41.9 ANXIETY: ICD-10-CM

## 2021-12-29 DIAGNOSIS — G47.33 OSA (OBSTRUCTIVE SLEEP APNEA): ICD-10-CM

## 2021-12-29 DIAGNOSIS — R06.02 SOB (SHORTNESS OF BREATH): Primary | ICD-10-CM

## 2021-12-29 PROCEDURE — 99215 OFFICE O/P EST HI 40 MIN: CPT | Performed by: NURSE PRACTITIONER

## 2021-12-29 RX ORDER — HYDROCHLOROTHIAZIDE 25 MG/1
25 TABLET ORAL EVERY MORNING
Qty: 30 TABLET | Refills: 0 | Status: SHIPPED | OUTPATIENT
Start: 2021-12-29 | End: 2022-01-12 | Stop reason: SDUPTHER

## 2021-12-29 NOTE — PROGRESS NOTES
12/29/21     Chief Complaint   Patient presents with    Mood Swings     \"No change since starting at all\"     HPI     Pt is here for a 6 week follow up since starting sertraline. He continues to have lots of complaints today and is not having any improvement since starting medication. Continues to be anxious and jittery. Previously prescribed nortriptyline - unclear results or if took medication or not. Today his biggest concern is his breathing. Started after having covid. Has had pulmonary work up that was negative for long haulers and/ or other pulmonary etiology. He denies any CP but does report an intermittent ache. Not worse with activity. Reports SOB is not changed since onset  Denies palpitations or dizziness. He had a normal stress echo complete 7/2020 at [facility]. Has not followed up with cardiology. Started checking his BP at home due to his \"breathing concerns\". States it has been high at other offices over the past year. Home BP is running 160s/90s consistently the past few months. He is taking lots of decongestants over the past few years, has started to use antihistamine only and not seen a change in BP. Allergies   Allergen Reactions    Pcn [Penicillins] Hives     Current Outpatient Medications   Medication Sig Dispense Refill    hydroCHLOROthiazide (HYDRODIURIL) 25 MG tablet Take 1 tablet by mouth every morning 30 tablet 0    sertraline (ZOLOFT) 50 MG tablet Take 1 tablet by mouth daily 30 tablet 1    aspirin-acetaminophen-caffeine (EXCEDRIN MIGRAINE) 250-250-65 MG per tablet Take 1 tablet by mouth every 6 hours as needed for Headaches       No current facility-administered medications for this visit. Review of Systems  Negative other than HPI    Vitals:    12/29/21 1438 12/29/21 1440   BP: (!) 168/80 (!) 168/80   Pulse: 98    SpO2: 97%    Weight: 245 lb (111.1 kg)       Physical Exam  Constitutional:       General: He is not in acute distress.      Appearance: Normal appearance. He is not ill-appearing. HENT:      Head: Normocephalic and atraumatic. Cardiovascular:      Rate and Rhythm: Normal rate and regular rhythm. Heart sounds: Normal heart sounds. Pulmonary:      Effort: Pulmonary effort is normal. No respiratory distress. Breath sounds: Normal breath sounds. No wheezing or rhonchi. Musculoskeletal:      Right lower leg: No edema. Left lower leg: No edema. Skin:     General: Skin is warm and dry. Neurological:      General: No focal deficit present. Mental Status: He is alert and oriented to person, place, and time. Mental status is at baseline. Psychiatric:         Mood and Affect: Mood is anxious. Behavior: Behavior normal.       Assessment/Plan:  SOB (shortness of breath)/ HTN/ SOURAV   Uncontrolled   Discussed options in detail  Reviewed stress echo in care everywhere  Declined EKG today   Reviewed extensive pulmonary work up and notes with pt in detail   Discussed seeing cardiology- requesting to see someone closer to home - referral provided   Starting medication for HTN   Plan to check renal at next appt   Bring BP log to appt   - hydroCHLOROthiazide (HYDRODIURIL) 25 MG tablet; Take 1 tablet by mouth every morning  Dispense: 30 tablet; Refill: 0  - [address] [facility]    Anxiety  Uncontrolled   Increase sertraline to 50 mg daily     Discussed medications with patient, who voiced understanding of their use and indications. All questions answered. Return in about 2 weeks (around 1/12/2022) for HTN . Electronically signed by SURESH Aden CNP on 12/29/2021 at 5:51 PM     45 min spent with patient- >50 % continuity of care and/or counseling.

## 2022-01-12 ENCOUNTER — OFFICE VISIT (OUTPATIENT)
Dept: INTERNAL MEDICINE CLINIC | Age: 52
End: 2022-01-12
Payer: COMMERCIAL

## 2022-01-12 VITALS
SYSTOLIC BLOOD PRESSURE: 138 MMHG | WEIGHT: 252 LBS | HEART RATE: 84 BPM | BODY MASS INDEX: 32.35 KG/M2 | DIASTOLIC BLOOD PRESSURE: 82 MMHG | OXYGEN SATURATION: 99 %

## 2022-01-12 DIAGNOSIS — R10.11 RUQ PAIN: ICD-10-CM

## 2022-01-12 DIAGNOSIS — I10 PRIMARY HYPERTENSION: Primary | ICD-10-CM

## 2022-01-12 DIAGNOSIS — I10 PRIMARY HYPERTENSION: ICD-10-CM

## 2022-01-12 DIAGNOSIS — R06.02 SOB (SHORTNESS OF BREATH): ICD-10-CM

## 2022-01-12 DIAGNOSIS — F41.9 ANXIETY: ICD-10-CM

## 2022-01-12 LAB
ALBUMIN SERPL-MCNC: 4.6 G/DL (ref 3.4–5)
ANION GAP SERPL CALCULATED.3IONS-SCNC: 14 MMOL/L (ref 3–16)
BUN BLDV-MCNC: 13 MG/DL (ref 7–20)
CALCIUM SERPL-MCNC: 9.6 MG/DL (ref 8.3–10.6)
CHLORIDE BLD-SCNC: 97 MMOL/L (ref 99–110)
CO2: 26 MMOL/L (ref 21–32)
CREAT SERPL-MCNC: 0.8 MG/DL (ref 0.9–1.3)
GFR AFRICAN AMERICAN: >60
GFR NON-AFRICAN AMERICAN: >60
GLUCOSE BLD-MCNC: 97 MG/DL (ref 70–99)
PHOSPHORUS: 3.1 MG/DL (ref 2.5–4.9)
POTASSIUM SERPL-SCNC: 4.1 MMOL/L (ref 3.5–5.1)
SODIUM BLD-SCNC: 137 MMOL/L (ref 136–145)

## 2022-01-12 PROCEDURE — 99215 OFFICE O/P EST HI 40 MIN: CPT | Performed by: NURSE PRACTITIONER

## 2022-01-12 RX ORDER — FAMOTIDINE 20 MG/1
20 TABLET, FILM COATED ORAL 2 TIMES DAILY
Qty: 180 TABLET | Refills: 0 | Status: SHIPPED | OUTPATIENT
Start: 2022-01-12 | End: 2022-04-11

## 2022-01-12 RX ORDER — HYDROCHLOROTHIAZIDE 25 MG/1
25 TABLET ORAL EVERY MORNING
Qty: 90 TABLET | Refills: 1 | Status: SHIPPED | OUTPATIENT
Start: 2022-01-12 | End: 2022-01-14 | Stop reason: DRUGHIGH

## 2022-01-12 NOTE — PROGRESS NOTES
1/12/22     Chief Complaint   Patient presents with    Hypertension     2 week check. Breathing is still the same. Pt. brought in home wrist cuff to compare 185/121     HPI    Here for 2 week follow up of HTN. Recently started on HCTZ 25 mg daily for hypertension. Having some dry mouth. Has home wrist cuff with him today - pt is getting higher readings on his cuff that conflict reading with manual BP in the office. Recently increased sertraline to 50 mg daily for anxiety and chronic abd pain. Feels less anxious in between meals. RUQ pain is dull and he thinks this makes his SOB worse. Cut back on caffeine and working on diet. Avoiding etoh. Has had a full GI work up with Dr. Jarrod Staley and has had multiple sets of imaging and EGD. Feels better off PPI. Previously prescribed nortriptyline - did not try this. Has appt with cardiology this week. Offered EKG at last appt and pt declined. Allergies   Allergen Reactions    Pcn [Penicillins] Hives     Current Outpatient Medications   Medication Sig Dispense Refill    hydroCHLOROthiazide (HYDRODIURIL) 25 MG tablet Take 1 tablet by mouth every morning 90 tablet 1    famotidine (PEPCID) 20 MG tablet Take 1 tablet by mouth 2 times daily 180 tablet 0    sertraline (ZOLOFT) 50 MG tablet Take 1 tablet by mouth daily 30 tablet 1    aspirin-acetaminophen-caffeine (EXCEDRIN MIGRAINE) 250-250-65 MG per tablet Take 1 tablet by mouth every 6 hours as needed for Headaches       No current facility-administered medications for this visit. Review of Systems  Negative other than HPI     Vitals:    01/12/22 1439   BP: 138/82   Pulse: 84   SpO2: 99%   Weight: 252 lb (114.3 kg)      Physical Exam  Constitutional:       General: He is not in acute distress. Appearance: Normal appearance. He is not ill-appearing. HENT:      Head: Normocephalic and atraumatic. Pulmonary:      Effort: Pulmonary effort is normal. No respiratory distress.    Neurological: General: No focal deficit present. Mental Status: He is alert and oriented to person, place, and time. Mental status is at baseline. Psychiatric:         Mood and Affect: Mood is anxious. Behavior: Behavior normal.       Assessment/Plan:  Primary hypertension  /82 in the office today. Home BP wrist cuff giving inacurately high results vs what's shown in the office. States mild dry mouth with HCTZ, otherwise no complaints. Renal panel today. Continue current treatment plan. - Renal Function Panel; Future  - hydroCHLOROthiazide (HYDRODIURIL) 25 MG tablet; Take 1 tablet by mouth every morning  Dispense: 90 tablet; Refill: 1    RUQ pain / chronic SOB  Ongoing for the past 1.5 years. Pain and SOB for hours after meals. Negative discomfort on palpation of abdomen x4 quadrants today. Negative Green's sign today. Has had thorough workup with GI, Dr Kristy Mejía. Multiple imaging studies and EGD. Hasn't been taking PPI as prescribed for gastritis found on EGD. States he stopped taking as this didn't help him, though he continues to c/o abdominal and throat discomfort in hours following meals. Last GI/liver profile WNL. CTAP w fatty liver and punctate kidney stone. Normal gallbladder imaging. HIDA scan negative. Trial of pepcid BID for chronic GERD symptoms. Encouraged to make appt with GI, Dr Kristy Mejía. To see cardiology on Friday for chronic and ongoing SOB - full pulmonary work up has been normal. Declined EKG. Patient given referral to general surgery to discuss possible flavia despite normal imaging and HIDA. - famotidine (PEPCID) 20 MG tablet; Take 1 tablet by mouth 2 times daily  Dispense: 180 tablet; Refill: 0  - Lucas Garcia MD, General Surgery, [facility]    Anxiety   Sertraline is helping some with mood/ anxiety - continue taking. Did not try nortriptyline as previously prescribed and discussed for anxiety / abd discomfort.      Discussed medications with patient, who voiced understanding of their use and indications. All questions answered. Electronically signed by SURESH Streeter CNP on 1/12/2022 at 3:56 PM     40 min spent with patient- >50 % continuity of care and/or counseling.

## 2022-01-13 NOTE — PROGRESS NOTES
[address]      Cardiology Consult    Lorenzo Esquivel  1970 January 13, 2022    Referring Physician: Ursula Way CNP  Reason for Referral: SOB, HTN    CC: \" problems breathing, ABD pain\"    HPI:  The patient is 54 y.o. male with a past medical history significant for HTN, SOURAV, chronic RUQ pain evaluated by GI, GERD and anxiety. He denies tobacco use and rarely drinks Etoh. He has a family hx of heart disease. He presents today for evaluation of chronic, ongoing shortness of breath. He states that he has problems breathing and taking a full breath. He was treated for bronchitis without improvement. He says \"90% of time I cannot complete a breath. \" He also reports headaches and epistaxis which improved after stopping zyrtec D. He also reports chronic RUQ pain, chronic ABD bloating and chronic dyspnea worsened by eating. He is also SOB at night at times. He is concerned that his eyelids and lips also swell. He is not sure if his SOB is related to his ABD pain. His symptoms have been chronic since 6/2020. He feels his NUÑEZ has somewhat improved. The patient denies exertional chest pain/tightness/pressure, palpitations, dizziness, syncope, worsening leg swelling and PND.        Past Medical History:   Diagnosis Date    Ankylosing spondylitis ([address]     Fatty liver  htn      Past Surgical History:   Procedure Laterality Date    HERNIA REPAIR Right 2000     Family History   Problem Relation Age of Onset    Liver Disease Mother              Atrial Fibrillation Father     Diabetes type 2  Father     Arthritis Father      Social History     Tobacco Use    Smoking status: Never Smoker    Smokeless tobacco: Never Used   Vaping Use    Vaping Use: Never used   Substance Use Topics    Alcohol use: Not Currently     Alcohol/week: 0.0 standard drinks     Comment:      Drug use: No       Allergies   Allergen Reactions    Pcn [Penicillins] Hives     Current Outpatient Medications   Medication Sig Dispense Refill    hydroCHLOROthiazide (HYDRODIURIL) 25 MG tablet Take 1 tablet by mouth every morning 90 tablet 1    famotidine (PEPCID) 20 MG tablet Take 1 tablet by mouth 2 times daily 180 tablet 0    sertraline (ZOLOFT) 50 MG tablet Take 1 tablet by mouth daily 30 tablet 1    aspirin-acetaminophen-caffeine (EXCEDRIN MIGRAINE) 250-250-65 MG per tablet Take 1 tablet by mouth every 6 hours as needed for Headaches       No current facility-administered medications for this visit. Review of Systems:  · Constitutional: no unanticipated weight loss. There's been no change in energy level, sleep pattern, or activity level. No fevers, chills. · Eyes: No visual changes or diplopia. No scleral icterus. · ENT: No Headaches, hearing loss or vertigo. No mouth sores or sore throat. · Cardiovascular: as reviewed in HPI  · Respiratory: No cough or wheezing, no sputum production. No hematemesis. · Gastrointestinal: No abdominal pain, appetite loss, blood in stools. No change in bowel or bladder habits. · Genitourinary: No dysuria, trouble voiding, or hematuria. · Musculoskeletal:  No gait disturbance, no joint complaints. · Integumentary: No rash or pruritis. · Neurological: No headache, diplopia, change in muscle strength, numbness or tingling. · Psychiatric: No anxiety or depression. · Endocrine: No temperature intolerance. No excessive thirst, fluid intake, or urination. No tremor. · Hematologic/Lymphatic: No abnormal bruising or bleeding, blood clots or swollen lymph nodes. · Allergic/Immunologic: No nasal congestion or hives. Physical Exam:   /84   Pulse 78   Ht 6' 2\" (1.88 m)   Wt 247 lb (112 kg)   SpO2 96%   BMI 31.71 kg/m²   Wt Readings from Last 3 Encounters:   01/12/22 252 lb (114.3 kg)   12/29/21 245 lb (111.1 kg)   11/16/21 245 lb (111.1 kg)     Constitutional: He is oriented to person, place, and time. He appears well-developed and well-nourished. In no acute distress.    Head: Normocephalic and atraumatic. Pupils equal and round. Neck: Neck supple. No JVP or carotid bruit appreciated. No mass and no thyromegaly present. No lymphadenopathy present. Cardiovascular: Normal rate. Normal heart sounds. Exam reveals no gallop and no friction rub. No murmur heard. Pulmonary/Chest: Effort normal and breath sounds normal. No respiratory distress. He has no wheezes, rhonchi or rales. Abdominal: Soft, non-tender. Bowel sounds are normal. He exhibits no organomegaly, mass or bruit. Extremities: No edema, cyanosis, or clubbing. Pulses are 2+ radial/dorsalis pedis/posterior tibial/carotid bilaterally. Neurological: No gross cranial nerve deficit. Coordination normal.   Skin: Skin is warm and dry. There is no rash or diaphoresis. Psychiatric: He has a normal mood and affect. His speech is normal and behavior is normal.     Lab Review:   FLP:    Lab Results   Component Value Date    TRIG 56 11/22/2021    HDL 51 11/22/2021    LDLCALC 95 11/22/2021    LABVLDL 11 11/22/2021     BUN/Creatinine:    Lab Results   Component Value Date    BUN 13 01/12/2022    CREATININE 0.8 01/12/2022       EKG Interpretation:   8/2020 EKG [facility] interpreted as sinus rhythm, Probable lateral infarct, age indeterminate   1/14/22 NSR, Old inferior-apical infarct. Image Review:     CT pulmonary 9/2020 no PE      Stress ECHO 7/2020 [facility]  - Normal echo stress.        PFT's done in past per pt  -report unavailable      Assessment/Plan:     Chronic dyspnea/RUQ ABD pain  -Patient is here as a new patient due to symptoms of chest pain and shortness of breath.  -His symptoms are chronic in nature.  -His work-up for PE was negative in 2020 so was his stress echocardiogram showing no evidence of reversible ischemia.  -His clinical examination is unremarkable  -Symptoms atypical for angina.    -EKG abnormal demonstrating old inferior-apical infarct.   -I think his EKG represent pseudo infarct pattern but with his current symptoms we will like to rule out underlying ischemic and or structural heart disease  -Recommend exercise myoview stress test to exclude ischemia.  -Will also arrange echocardiogram to exclude structural and/or functional abnormalities. Abnormal ECG  -EKG today is suggestive of inferoapical MI.  -His symptoms are not consistent with underlying ischemic heart disease. .  -Pseudo infarct pattern is suspected    HTN. BP goal < 130/80  Controlled    SOURAV. Compliant with CPAP     F/u in office in 3-4 weeks. Complexity of medical decision making-high    Thank you very much for allowing me to participate in the care of your patient. Please do not hesitate to contact me if you have any questions. Sincerely,  Beth Choi MD      [facility], [address] [place] [address]  Ph: [phone]  Fax: [phone]    This note was scribed in the presence of the physician by Tresa Eli RN.

## 2022-01-14 ENCOUNTER — OFFICE VISIT (OUTPATIENT)
Dept: CARDIOLOGY CLINIC | Age: 52
End: 2022-01-14
Payer: COMMERCIAL

## 2022-01-14 VITALS
HEART RATE: 78 BPM | WEIGHT: 247 LBS | BODY MASS INDEX: 31.7 KG/M2 | OXYGEN SATURATION: 96 % | SYSTOLIC BLOOD PRESSURE: 136 MMHG | DIASTOLIC BLOOD PRESSURE: 84 MMHG | HEIGHT: 74 IN

## 2022-01-14 DIAGNOSIS — R94.31 ABNORMAL EKG: ICD-10-CM

## 2022-01-14 DIAGNOSIS — R06.02 SHORTNESS OF BREATH: ICD-10-CM

## 2022-01-14 DIAGNOSIS — I25.9 CHEST PAIN DUE TO MYOCARDIAL ISCHEMIA, UNSPECIFIED ISCHEMIC CHEST PAIN TYPE: ICD-10-CM

## 2022-01-14 DIAGNOSIS — I10 PRIMARY HYPERTENSION: Primary | ICD-10-CM

## 2022-01-14 PROCEDURE — 93000 ELECTROCARDIOGRAM COMPLETE: CPT | Performed by: INTERNAL MEDICINE

## 2022-01-14 PROCEDURE — 99205 OFFICE O/P NEW HI 60 MIN: CPT | Performed by: INTERNAL MEDICINE

## 2022-01-14 RX ORDER — HYDROCHLOROTHIAZIDE 25 MG/1
25 TABLET ORAL DAILY
COMMUNITY
End: 2022-02-11

## 2022-01-14 NOTE — PATIENT INSTRUCTIONS
Patient Education        A Healthy Heart: Care Instructions  Your Care Instructions     Coronary artery disease, also called heart disease, occurs when a substance called plaque builds up in the vessels that supply oxygen-rich blood to your heart muscle. This can narrow the blood vessels and reduce blood flow. A heart attack happens when blood flow is completely blocked. A high-fat diet, smoking, and other factors increase the risk of heart disease. Your doctor has found that you have a chance of having heart disease. You can do lots of things to keep your heart healthy. It may not be easy, but you can change your diet, exercise more, and quit smoking. These steps really work to lower your chance of heart disease. Follow-up care is a key part of your treatment and safety. Be sure to make and go to all appointments, and call your doctor if you are having problems. It's also a good idea to know your test results and keep a list of the medicines you take. How can you care for yourself at home? Diet    · Use less salt when you cook and eat. This helps lower your blood pressure. Taste food before salting. Add only a little salt when you think you need it. With time, your taste buds will adjust to less salt.     · Eat fewer snack items, fast foods, canned soups, and other high-salt, high-fat, processed foods.     · Read food labels and try to avoid saturated and trans fats. They increase your risk of heart disease by raising cholesterol levels.     · Limit the amount of solid fat-butter, margarine, and shortening-you eat. Use olive, peanut, or canola oil when you cook. Bake, broil, and steam foods instead of frying them.     · Eat a variety of fruit and vegetables every day. Dark green, deep orange, red, or yellow fruits and vegetables are especially good for you.  Examples include spinach, carrots, peaches, and berries.     · Foods high in fiber can reduce your cholesterol and provide important vitamins and minerals. High-fiber foods include whole-grain cereals and breads, oatmeal, beans, brown rice, citrus fruits, and apples.     · Eat lean proteins. Heart-healthy proteins include seafood, lean meats and poultry, eggs, beans, peas, nuts, seeds, and soy products.     · Limit drinks and foods with added sugar. These include candy, desserts, and soda pop. Lifestyle changes    · If your doctor recommends it, get more exercise. Walking is a good choice. Bit by bit, increase the amount you walk every day. Try for at least 30 minutes on most days of the week. You also may want to swim, bike, or do other activities.     · Do not smoke. If you need help quitting, talk to your doctor about stop-smoking programs and medicines. These can increase your chances of quitting for good. Quitting smoking may be the most important step you can take to protect your heart. It is never too late to quit.     · Limit alcohol to 2 drinks a day for men and 1 drink a day for women. Too much alcohol can cause health problems.     · Manage other health problems such as diabetes, high blood pressure, and high cholesterol. If you think you may have a problem with alcohol or drug use, talk to your doctor. Medicines    · Take your medicines exactly as prescribed. Call your doctor if you think you are having a problem with your medicine.     · If your doctor recommends aspirin, take the amount directed each day. Make sure you take aspirin and not another kind of pain reliever, such as acetaminophen (Tylenol). When should you call for help? Call 911 if you have symptoms of a heart attack. These may include:    · Chest pain or pressure, or a strange feeling in the chest.     · Sweating.     · Shortness of breath.     · Pain, pressure, or a strange feeling in the back, neck, jaw, or upper belly or in one or both shoulders or arms.     · Lightheadedness or sudden weakness.     · A fast or irregular heartbeat.    After you call 911, the  may tell you to chew 1 adult-strength or 2 to 4 low-dose aspirin. Wait for an ambulance. Do not try to drive yourself. Watch closely for changes in your health, and be sure to contact your doctor if you have any problems. Where can you learn more? Go to https://[email or web address].[email or web address] org and sign in to your [employer] account. Enter M144 in the HSTYLE box to learn more about \"A Healthy Heart: Care Instructions. \"     If you do not have an account, please click on the \"Sign Up Now\" link. Current as of: April 29, 2021               Content Version: 13.1  © 0166-6962 [employer]. Care instructions adapted under license by [facility] ([facility]). If you have questions about a medical condition or this instruction, always ask your healthcare professional. [address] any warranty or liability for your use of this information.

## 2022-01-18 ENCOUNTER — HOSPITAL ENCOUNTER (OUTPATIENT)
Dept: NON INVASIVE DIAGNOSTICS | Age: 52
Discharge: HOME | End: 2022-01-18
Payer: COMMERCIAL

## 2022-01-18 LAB
LV EF: 67 %
LVEF MODALITY: NORMAL

## 2022-01-18 PROCEDURE — 78452 HT MUSCLE IMAGE SPECT MULT: CPT

## 2022-01-18 PROCEDURE — A9502 TC99M TETROFOSMIN: HCPCS | Performed by: INTERNAL MEDICINE

## 2022-01-18 PROCEDURE — 3430000000 HC RX DIAGNOSTIC RADIOPHARMACEUTICAL: Performed by: INTERNAL MEDICINE

## 2022-01-18 PROCEDURE — 93017 CV STRESS TEST TRACING ONLY: CPT

## 2022-01-18 RX ADMIN — TETROFOSMIN 30 MILLICURIE: 1.38 INJECTION, POWDER, LYOPHILIZED, FOR SOLUTION INTRAVENOUS at 08:43

## 2022-01-18 RX ADMIN — TETROFOSMIN 10 MILLICURIE: 1.38 INJECTION, POWDER, LYOPHILIZED, FOR SOLUTION INTRAVENOUS at 07:31

## 2022-01-21 ENCOUNTER — HOSPITAL ENCOUNTER (OUTPATIENT)
Dept: CARDIOLOGY | Age: 52
Discharge: HOME | End: 2022-01-21
Payer: COMMERCIAL

## 2022-01-21 LAB
LV EF: 63 %
LVEF MODALITY: NORMAL

## 2022-01-21 PROCEDURE — 93306 TTE W/DOPPLER COMPLETE: CPT

## 2022-02-10 NOTE — PROGRESS NOTES
[facility]      Cardiology Progress Note    Lorenzo Esquivel  1970 February 11, 2022    Referring Physician: Ursula Way CNP  Reason for Referral: SOB, HTN    CC: \"I still have fatigue and trouble breathing out, bloated feeling. \"     HPI:  The patient is 54 y.o. male with a past medical history significant for HTN, SOURAV, chronic RUQ pain evaluated by GI, GERD and anxiety. He denies tobacco use and rarely drinks Etoh. He has a family hx of heart disease. He presented 1/14/22 for evaluation of chronic, ongoing shortness of breath. He states that he has problems breathing and taking a full breath. He was treated for bronchitis without improvement. He says \"90% of time I cannot complete a breath. \" He also reports headaches and epistaxis which improved after stopping zyrtec D. He also reports chronic RUQ pain, chronic ABD bloating and chronic dyspnea worsened by eating. He is also SOB at night at times. He is concerned that his eyelids and lips also swell. He is not sure if his SOB is related to his ABD pain. His symptoms have been chronic since 6/2020. He feels his NUÑEZ has somewhat improved. The patient denies exertional chest pain/tightness/pressure, palpitations, dizziness, syncope, worsening leg swelling and PND. Today, he continues with fatigue. Cant complete a deep breath, feels bloated. Patient denies exertional chest pain/pressure, dyspnea at rest, changes NUÑEZ, PND, orthopnea, palpitations, lightheadedness, weight changes, changes in LE edema, and syncope. Medical therapy compliance and tolerating.        Past Medical History:   Diagnosis Date    Ankylosing spondylitis ([facility] [address]     Fatty liver  htn      Past Surgical History:   Procedure Laterality Date    HERNIA REPAIR Right 2000     Family History   Problem Relation Age of Onset    Liver Disease Mother              Atrial Fibrillation Father     Diabetes type 2  Father     Arthritis Father     Hypertension Father      Social History Tobacco Use    Smoking status: Never Smoker    Smokeless tobacco: Never Used   Vaping Use    Vaping Use: Never used   Substance Use Topics    Alcohol use: Not Currently     Alcohol/week: 0.0 standard drinks     Comment:      Drug use: Never       Allergies   Allergen Reactions    Pcn [Penicillins] Hives     Current Outpatient Medications   Medication Sig Dispense Refill    Cetirizine HCl (ZYRTEC PO) Take by mouth as needed      famotidine (PEPCID) 20 MG tablet Take 1 tablet by mouth 2 times daily 180 tablet 0    sertraline (ZOLOFT) 50 MG tablet Take 1 tablet by mouth daily 30 tablet 1    aspirin-acetaminophen-caffeine (EXCEDRIN MIGRAINE) 250-250-65 MG per tablet Take 1 tablet by mouth every 6 hours as needed for Headaches       No current facility-administered medications for this visit. Review of Systems:  · Constitutional: no unanticipated weight loss. There's been no change in energy level, sleep pattern, or activity level. No fevers, chills. · Eyes: No visual changes or diplopia. No scleral icterus. · ENT: No Headaches, hearing loss or vertigo. No mouth sores or sore throat. · Cardiovascular: as reviewed in HPI  · Respiratory: No cough or wheezing, no sputum production. No hematemesis. · Gastrointestinal: No abdominal pain, appetite loss, blood in stools. No change in bowel or bladder habits. · Genitourinary: No dysuria, trouble voiding, or hematuria. · Musculoskeletal:  No gait disturbance, no joint complaints. · Integumentary: No rash or pruritis. · Neurological: No headache, diplopia, change in muscle strength, numbness or tingling. · Psychiatric: No anxiety or depression. · Endocrine: No temperature intolerance. No excessive thirst, fluid intake, or urination. No tremor. · Hematologic/Lymphatic: No abnormal bruising or bleeding, blood clots or swollen lymph nodes. · Allergic/Immunologic: No nasal congestion or hives.     Physical Exam:   /82   Pulse 84   Ht 6' 2\" (1.88 m)   Wt 252 lb (114.3 kg)   SpO2 97%   BMI 32.35 kg/m²   Wt Readings from Last 3 Encounters:   02/11/22 252 lb (114.3 kg)   01/14/22 247 lb (112 kg)   01/12/22 252 lb (114.3 kg)     Constitutional: He is oriented to person, place, and time. He appears well-developed and well-nourished. In no acute distress. Head: Normocephalic and atraumatic. Pupils equal and round. Neck: Neck supple. No JVP or carotid bruit appreciated. No mass and no thyromegaly present. No lymphadenopathy present. Cardiovascular: Normal rate. Normal heart sounds. Exam reveals no gallop and no friction rub. No murmur heard. Pulmonary/Chest: Effort normal and breath sounds normal. No respiratory distress. He has no wheezes, rhonchi or rales. Abdominal: Soft, non-tender. Bowel sounds are normal. He exhibits no organomegaly, mass or bruit. Extremities: No edema, cyanosis, or clubbing. Pulses are 2+ radial/dorsalis pedis/posterior tibial/carotid bilaterally. Neurological: No gross cranial nerve deficit. Coordination normal.   Skin: Skin is warm and dry. There is no rash or diaphoresis. Psychiatric: He has a normal mood and affect. His speech is normal and behavior is normal.     Lab Review:   Lab Results   Component Value Date    TRIG 56 11/22/2021    HDL 51 11/22/2021    LDLCALC 95 11/22/2021    LABVLDL 11 11/22/2021      Lab Results   Component Value Date    BUN 13 01/12/2022    CREATININE 0.8 01/12/2022       EKG Interpretation:   8/2020 EKG [facility] interpreted as sinus rhythm, Probable lateral infarct, age indeterminate   1/14/22 NSR, Old inferior-apical infarct. 2/11/22: not completed    Image Review:     CT pulmonary 9/2020 no PE      Stress ECHO 7/2020 T[facility]  - Normal echo stress.        PFT's done in past per pt  -report unavailable      Echo: 1/21/22   Normal left ventricle size, wall thickness, and systolic function with an   estimated ejection fraction of 60-65%. No regional wall motion abnormalities   are seen.  Normal diastolic function. The right ventricle is normal in size and function. Trivial tricuspid regurgitation. The ascending aorta is mildly dilated measuring 3.6 cm. Nuclear: 1/18/22      Summary    Normal myocardial perfusion study.    Normal LV size and systolic function.        Normal exercise stress test.    Good exercise capacity for age.        Overall findings represent a low risk study. Assessment/Plan:     Chronic dyspnea/RUQ ABD pain  -Patient presented as new patient  1/14/22 due to symptoms of chest pain and shortness of breath. His symptoms are chronic in nature.  -His work-up for PE was negative in 2020 so was his stress echocardiogram showing no evidence of reversible ischemia.  -Stretch of esophagus with Dr. Melene Harada   -mother with autoimmune disease. -EKG 1/14/22  abnormal demonstrating old inferior-apical infarct.     -Today, he continues with fatigue and has not noticed a difference.   -physical exam again normal  -BP normal off HCTZ.   -1/18/22 exercise myoview stress test normal   -1/21/22 echo normal   -His clinical examination is unremarkable  -His symptoms appear atypical for underlying ischemic heart disease and since his noninvasive test are normal, will not proceed with any further cardiac work-up at this time  Abnormal ECG  -EKG 1/14/22 is suggestive of inferoapical MI.  -His symptoms are not consistent with underlying ischemic heart disease. .  -Pseudo infarct pattern is suspected  1/2021 completed Echo and stress which were both normal     HTN. BP goal < 130/80  He has been off of HCTZ last 3 week. He also took himself off of ZyrtecD. Controlled today  BMP 1/12/22 stable  Encouraged strict low salt diet, walking program, weight management. Will continue to monitor    SOURAV. Working on compliance with CPAP   He was prescribed 4-5 years. Follow up routinely with sleep medicine. Ankylosing spondylitis   Mother had autoimmune disease AS as well as Hashimoto.

## 2022-02-11 ENCOUNTER — OFFICE VISIT (OUTPATIENT)
Dept: CARDIOLOGY CLINIC | Age: 52
End: 2022-02-11
Payer: COMMERCIAL

## 2022-02-11 VITALS
HEART RATE: 84 BPM | BODY MASS INDEX: 32.34 KG/M2 | OXYGEN SATURATION: 97 % | SYSTOLIC BLOOD PRESSURE: 136 MMHG | DIASTOLIC BLOOD PRESSURE: 82 MMHG | WEIGHT: 252 LBS | HEIGHT: 74 IN

## 2022-02-11 DIAGNOSIS — R06.09 CHRONIC DYSPNEA: Primary | ICD-10-CM

## 2022-02-11 DIAGNOSIS — R94.31 ABNORMAL EKG: ICD-10-CM

## 2022-02-11 DIAGNOSIS — G47.33 OSA (OBSTRUCTIVE SLEEP APNEA): ICD-10-CM

## 2022-02-11 DIAGNOSIS — I10 PRIMARY HYPERTENSION: ICD-10-CM

## 2022-02-11 PROCEDURE — 99213 OFFICE O/P EST LOW 20 MIN: CPT | Performed by: INTERNAL MEDICINE

## 2022-02-17 ENCOUNTER — TELEPHONE (OUTPATIENT)
Dept: INTERNAL MEDICINE CLINIC | Age: 52
End: 2022-02-17

## 2022-02-17 DIAGNOSIS — R53.82 CHRONIC FATIGUE: Primary | ICD-10-CM

## 2022-02-17 NOTE — TELEPHONE ENCOUNTER
Please Hold for Dr. Damion Leal. Called pt to advise what [facility]. He states dr. Damion Leal recommended he see an endocrinologist. He wanted me to see if dr. Damion Leal would advise. He is aware he is out of the office and may not address until Monday.

## 2022-03-03 ENCOUNTER — OFFICE VISIT (OUTPATIENT)
Dept: RHEUMATOLOGY | Age: 52
End: 2022-03-03
Payer: COMMERCIAL

## 2022-03-03 VITALS
HEIGHT: 74 IN | WEIGHT: 264 LBS | DIASTOLIC BLOOD PRESSURE: 82 MMHG | HEART RATE: 80 BPM | SYSTOLIC BLOOD PRESSURE: 130 MMHG | BODY MASS INDEX: 33.88 KG/M2

## 2022-03-03 DIAGNOSIS — R53.82 CHRONIC FATIGUE: ICD-10-CM

## 2022-03-03 DIAGNOSIS — M45.7 ANKYLOSING SPONDYLITIS OF LUMBOSACRAL REGION (HCC): Primary | ICD-10-CM

## 2022-03-03 DIAGNOSIS — Z79.899 ENCOUNTER FOR LONG-TERM (CURRENT) USE OF MEDICATIONS: ICD-10-CM

## 2022-03-03 DIAGNOSIS — M45.7 ANKYLOSING SPONDYLITIS OF LUMBOSACRAL REGION (HCC): ICD-10-CM

## 2022-03-03 PROCEDURE — 99213 OFFICE O/P EST LOW 20 MIN: CPT | Performed by: INTERNAL MEDICINE

## 2022-03-03 NOTE — PROGRESS NOTES
Subjective:       Ancelmo Chamberlain is a 54 y.o. male patient returns for follow-up of ankylosing spondylitis. Not been seen in over a year. He recently developed olecranon bursitis on the left elbow following some trauma at work. X-rays obtained at [facility] revealed fusion of the SI joints      Review of Systems:      Complains of shortness of breath. Complains of difficulty swallowing all symptoms are intermittent complains of intermittent swelling of his lips. He feels better when he works hard. Objective:       /82   Pulse 80   Ht 6' 2\" (1.88 m)   Wt 264 lb (119.7 kg)   BMI 33.90 kg/m² Alert male in no acute distress  Lungs clear to auscultation cardiovascular exam normal sinus rhythm. No definite peripheral synovitis no rash musculoskeletal Ortho exam revealed full range of motion of the neck. Back forward flexion to at least 90 degrees extension 10 degrees a  Assessment: Ankylosing spondylitis. Plan:      Is willing to consider Cosentyx or Garlon Ser. He restarted Azulfidine 1000 mg daily which was his  Previous therapy. Blood work was obtained to look for evidence of inflammation or hyperthyroidism. Arrangements for follow-up will be made once the blood tests are available.     Michael Antunez MD, MD, 3/3/2022 2:25 PM

## 2022-03-04 LAB
ALBUMIN SERPL-MCNC: 4.8 G/DL (ref 3.4–5)
ALP BLD-CCNC: 100 U/L (ref 40–129)
ALT SERPL-CCNC: 25 U/L (ref 10–40)
AST SERPL-CCNC: 22 U/L (ref 15–37)
BILIRUB SERPL-MCNC: 0.3 MG/DL (ref 0–1)
BILIRUBIN DIRECT: <0.2 MG/DL (ref 0–0.3)
BILIRUBIN, INDIRECT: NORMAL MG/DL (ref 0–1)
C-REACTIVE PROTEIN: 23.4 MG/L (ref 0–5.1)
TOTAL PROTEIN: 7.5 G/DL (ref 6.4–8.2)
TSH SERPL DL<=0.05 MIU/L-ACNC: 1.36 UIU/ML (ref 0.27–4.2)

## 2022-03-14 ENCOUNTER — TELEPHONE (OUTPATIENT)
Dept: RHEUMATOLOGY | Age: 52
End: 2022-03-14

## 2022-03-14 DIAGNOSIS — Z79.899 ENCOUNTER FOR LONG-TERM (CURRENT) USE OF MEDICATIONS: ICD-10-CM

## 2022-03-14 DIAGNOSIS — M45.7 ANKYLOSING SPONDYLITIS OF LUMBOSACRAL REGION (HCC): Primary | ICD-10-CM

## 2022-03-14 NOTE — TELEPHONE ENCOUNTER
Patient called in wanting to know who do you recommend for endocrinology. Also he wants to know the next steps regarding \"shots\" he states that he spoke with you regarding this at his last appointment.

## 2022-03-15 NOTE — TELEPHONE ENCOUNTER
Called pt and gave him names of [facility] endocrinologist.  He wanted to ask Dr Sima Emery if he has a preference with the injection medications : cosentyx or taltz. Advised I would check with Dr Padmini Gil and call him on Wednesday.

## 2022-03-22 ENCOUNTER — TELEPHONE (OUTPATIENT)
Dept: RHEUMATOLOGY | Age: 52
End: 2022-03-22

## 2022-03-22 RX ORDER — IXEKIZUMAB 80 MG/ML
INJECTION, SOLUTION SUBCUTANEOUS
Qty: 2 ML | Refills: 0 | Status: SHIPPED | OUTPATIENT
Start: 2022-03-22 | End: 2022-04-29

## 2022-03-22 RX ORDER — IXEKIZUMAB 80 MG/ML
INJECTION, SOLUTION SUBCUTANEOUS
Qty: 1 ML | Refills: 2 | Status: SHIPPED | OUTPATIENT
Start: 2022-03-22 | End: 2022-04-29

## 2022-03-22 NOTE — TELEPHONE ENCOUNTER
Need VOB for Taltz new start. DX Ankylosing spondylitis, M45.7. Has starting dose of 160mg at week 0 and the maintenance dose of 80mg every 4 weeks after that.

## 2022-03-23 NOTE — TELEPHONE ENCOUNTER
I have sent clinicals to [facility] for a PA on TALTZ  TB TEST RESULTS SENT TO [facility]

## 2022-03-31 DIAGNOSIS — Z79.899 ENCOUNTER FOR LONG-TERM (CURRENT) USE OF MEDICATIONS: ICD-10-CM

## 2022-03-31 DIAGNOSIS — M45.7 ANKYLOSING SPONDYLITIS OF LUMBOSACRAL REGION (HCC): ICD-10-CM

## 2022-04-04 LAB
QUANTI TB GOLD PLUS: NEGATIVE
QUANTI TB1 MINUS NIL: 0 IU/ML (ref 0–0.34)
QUANTI TB2 MINUS NIL: 0 IU/ML (ref 0–0.34)
QUANTIFERON MITOGEN: >10 IU/ML
QUANTIFERON NIL: 0.11 IU/ML

## 2022-04-11 ENCOUNTER — OFFICE VISIT (OUTPATIENT)
Dept: ENDOCRINOLOGY | Age: 52
End: 2022-04-11
Payer: COMMERCIAL

## 2022-04-11 VITALS
RESPIRATION RATE: 16 BRPM | WEIGHT: 260 LBS | DIASTOLIC BLOOD PRESSURE: 97 MMHG | BODY MASS INDEX: 33.37 KG/M2 | HEART RATE: 86 BPM | SYSTOLIC BLOOD PRESSURE: 153 MMHG | HEIGHT: 74 IN

## 2022-04-11 DIAGNOSIS — I10 PRIMARY HYPERTENSION: Primary | ICD-10-CM

## 2022-04-11 DIAGNOSIS — M45.7 ANKYLOSING SPONDYLITIS OF LUMBOSACRAL REGION (HCC): ICD-10-CM

## 2022-04-11 DIAGNOSIS — R06.02 SHORTNESS OF BREATH: ICD-10-CM

## 2022-04-11 PROCEDURE — 99203 OFFICE O/P NEW LOW 30 MIN: CPT | Performed by: INTERNAL MEDICINE

## 2022-04-11 RX ORDER — ETODOLAC 400 MG/1
400 TABLET, FILM COATED ORAL 2 TIMES DAILY
COMMUNITY
End: 2022-04-14 | Stop reason: SDUPTHER

## 2022-04-11 NOTE — PATIENT INSTRUCTIONS
INTRODUCTION  The following instructions will guide you in the proper collection of a 24-hour urine specimen. In some instances, you will be asked to collect two or three consecutive 24-hour urine samples. INSTRUCTIONS  ? You should collect every drop of urine during each 24-hour period. It does not matter how much or little urine is passed each time, as long as every drop is collected. ? Begin the urine collection in the morning after you wake up, after you have emptied your bladder for the first time. ? Urinate (empty the bladder) for the first time and flush it down the toilet. Note the exact time (eg, 6:15 AM). You will begin the urine collection at this time. ?Collect every drop of urine during the day and night in an empty collection bottle. Store the bottle at room temperature or in the refrigerator. ?If you need to have a bowel movement, any urine passed with the bowel movement should be collected. Try not to include feces with the urine collection. If feces does get mixed in, do not try to remove the feces from the urine collection bottle. ? Finish by collecting the first urine passed the next morning, adding it to the collection bottle. This should be within ten minutes before or after the time of the first morning void on the first day (which was flushed). In this example, you would try to void between 6:05 and 6:25 on the second day. If you need to urinate one hour before the final collection time, drink a full glass of water so that you can void again at the appropriate time. If you have to urinate 20 minutes before, try to hold the urine until the proper time. Please note the exact time of the final collection, even if it is not the same time as when collection began on day 1. STORAGE  The bottle(s) may be kept at room temperature for a day or two, but should be kept cool or refrigerated for longer periods of time.   WHERE TO GET MORE INFORMATION  Your healthcare provider is the best source of information for questions and concerns related to your medical problem.

## 2022-04-11 NOTE — PROGRESS NOTES
SUBJECTIVE:  Chriss Cantu is a 54 y.o. male who is referred here for nonspecific symptoms of shortness of breath and chronic fatigue. Patient started having SOB , bloating ( started in 2020) , fatigue and headache. He also c/o high blood pressure and weight gain almost 40 lbs in the last one year . He denies any skin color changes. Has a red rash on his neck for years and has been evaluated by dermatologist   current complaints: fatigue, weight gain, palpitations, sweating, heat intolerance  History of obstructive symptoms: difficulty swallowing No, changes in voice/hoarseness No.    History of radiation to patient's neck: NO  Recent iodine exposure: No  Family history includes no thyroid abnormalities. Family history of thyroid cancer: No    He has hypertension and was previously taking HCTZ and captopril but they were stopped after he ad a normal echocardiogram.  Blood pressure was high on initial evaluation. Patient is advised to check his blood pressure at home and call me back if it stays elevated. He has been on steroid off and on for breathing issues and was getting steroid tapers. He has been to pulmonologist in the past ad has bee diagnosed with Sleep apnea and wears masks. He was given some inhalers which didn't help at all. He has been diagnosed with ankylosing spondylitis and was prescribed sulfasalazine and Taltz by Dr Rupali Sy. He has not started using these medications yet.         Past Medical History:   Diagnosis Date    [facility]ylitis ([facility] [address]     Fatty liver     Hypertension      Patient Active Problem List    Diagnosis Date Noted    RUQ pain 01/12/2022    Anxiety 12/29/2021    Primary hypertension 12/29/2021    SOURAV (obstructive sleep apnea)     Hypersomnia 11/09/2020    Shortness of breath 11/09/2020    Alpha-1-antitrypsin deficiency carrier 11/09/2020    Rash 11/09/2020    Ankylosing spondylitis ([facility] [address] 07/13/2011    Dysfunction of eustachian tube 08/10/2010 Past Surgical History:   Procedure Laterality Date    HERNIA REPAIR Right 2000     Family History   Problem Relation Age of Onset    Liver Disease Mother              Atrial Fibrillation Father     Diabetes type 2  Father     Arthritis Father     Hypertension Father      Social History     Socioeconomic History    Marital status: Single     Spouse name: None    Number of children: None    Years of education: None    Highest education level: None   Occupational History    None   Tobacco Use    Smoking status: Never Smoker    Smokeless tobacco: Never Used   Vaping Use    Vaping Use: Never used   Substance and Sexual Activity    Alcohol use: Not Currently     Alcohol/week: 0.0 standard drinks     Comment:      Drug use: Never    Sexual activity: Not Currently   Other Topics Concern    None   Social History Narrative    None     Social Determinants of Health     Financial Resource Strain: Low Risk     Difficulty of Paying Living Expenses: Not hard at all   Food Insecurity: No Food Insecurity    Worried About Running Out of Food in the Last Year: Never true    Nova of Food in the Last Year: Never true   Transportation Needs:     Lack of Transportation (Medical): Not on file    Lack of Transportation (Non-Medical):  Not on file   Physical Activity:     Days of Exercise per Week: Not on file    Minutes of Exercise per Session: Not on file   Stress:     Feeling of Stress : Not on file   Social Connections:     Frequency of Communication with Friends and Family: Not on file    Frequency of Social Gatherings with Friends and Family: Not on file    Attends Zoroastrianism Services: Not on file    Active Member of Clubs or Organizations: Not on file    Attends Club or Organization Meetings: Not on file    Marital Status: Not on file   Intimate Partner Violence:     Fear of Current or Ex-Partner: Not on file    Emotionally Abused: Not on file    Physically Abused: Not on file   [facility] Sexually Abused: Not on file   Housing Stability:     Unable to Pay for Housing in the Last Year: Not on file    Number of Christy in the Last Year: Not on file    Unstable Housing in the Last Year: Not on file     Current Outpatient Medications   Medication Sig Dispense Refill    etodolac (LODINE) 400 MG tablet Take 400 mg by mouth 2 times daily      PHENYLEPHRINE HCL NA by Nasal route      Cetirizine HCl (ZYRTEC PO) Take by mouth as needed      ixekizumab (TALTZ) 80 MG/ML SOAJ prefilled syringe Starting dose of 160 mg/ml (2 pens) sc week 0, then maintenance dose of  80 mg/ml sc every 4 weeks there after (Patient not taking: Reported on 4/11/2022) 2 mL 0    ixekizumab (TALTZ) 80 MG/ML SOAJ prefilled syringe Maintenance dose of  80 mg/ml sc every 4 weeks after starting dose (Patient not taking: Reported on 4/11/2022) 1 mL 2     No current facility-administered medications for this visit. Allergies   Allergen Reactions    Pcn [Penicillins] Hives         Review of Systems:  I have reviewed the review of system questionnaire filled by the patient . Patient was advised to contact PCP for non endocrine signs and symptoms       OBJECTIVE:   BP (!) 153/97   Pulse 86   Resp 16   Ht 6' 2\" (1.88 m)   Wt 260 lb (117.9 kg)   BMI 33.38 kg/m²   Wt Readings from Last 3 Encounters:   04/11/22 260 lb (117.9 kg)   03/03/22 264 lb (119.7 kg)   02/11/22 252 lb (114.3 kg)       Physical Exam:  Constitutional: no acute distress, well appearing, well nourished  Psychiatric: oriented to person, place and time, judgement, insight and normal, recent and remote memory and intact and mood, affect are normal  Skin: skin and subcutaneous tissue is normal without mass, normal turgor  Head and Face: examination of head and face revealed no abnormalities  Eyes: no lid or conjunctival swelling, no erythema or discharge, pupils are normal, equal, round.   Ears/Nose: external inspection of ears and nose revealed no abnormalities, hearing is grossly normal  Oropharynx/Mouth/Face: lips, tongue and gums are normal with no lesions, the voice quality was normal  Neck: neck is supple and symmetric, with midline trachea and no masses, thyroid is Difficult to palpate due to body habitus l  Lymphatics: normal cervical lymph nodes, normal supraclavicular nodes  Pulmonary: no increased work of breathing or signs of respiratory distress, lungs are clear to auscultation  Cardiovascular: normal heart rate and rhythm, normal S1 and S2, no murmurs   Abdomen: abdomen is soft, non-tender with no masses  Musculoskeletal: normal gait and station. Neurological: normal coordination, normal general cortical function    Lab Review:  Lab Results   Component Value Date    TSH 1.36 03/03/2022    TSH 0.78 08/12/2020     No results found for: T4FREE     ASSESSMENT/PLAN:  1. Chronic fatigue, red rash on face and episodes of  hypertension  Patient has very nonspecific symptoms not pointing towards any clear endocrine etiology for his described symptoms. He does not have episodic symptoms suggestive of pheochromocytoma or any symptoms suggestive of adrenal insufficiency. He does have facial plethora with 40 pounds weight gain with elevated blood pressure in the last 1 year for which I have ordered 24-hour urine cortisol. I have ordered for following hormonal work-up and stressed to the patient since he lives in [place] he will get the blood work done close to home upon awakening and call me back with results. Patient was advised that he needs to call me to go over the results as I might not even get the results from his lab since they are not part of [facility]. He verbalized understanding and will call me to go over the results and if the results are all within normal limits we will follow-up with his primary care physician. Patient is on omeprazole off and on he was advised to stop taking that medication at least 3 to 4 days prior doing well blood work.   Patient was also advised to check his blood pressure at home and if stays above 130/80 he will call me or call his primary care physician to go back on the blood pressure medication.    - Catecholamines, Plasma Fractionated; Future  - Tryptase; Future  - Serotonin, Whole Blood; Future  - TSH; Future  - T4, Free; Future  - Anti-Thyroglobulin Antibody; Future  - Thyroid Peroxidase Antibody; Future  - Thyrotropin receptor antibody; Future  - Thyroid Stimulating Immunoglobulin; Future  - Comprehensive Metabolic Panel; Future  - Follicle Stimulating Hormone; Future  - Luteinizing Hormone; Future  - Insulin-Like Growth Factor; Future  - Testosterone, free, total; Future  - Cortisol, Urine, Free; Future    2. Shortness of breath  Previously was evaluated by 2 pulmonologist and had abnormal sleep studies and was prescribed CPAP mask with no improvement in current symptoms. Previously was also prescribed inhalers which did not help with the shortness of breath. 3. Ankylosing spondylitis of lumbosacral region [facility])  Patient follows with rheumatologist and was prescribed taltz and sulfasalazine he is not taking any of those he was also prescribed etodolac. Reviewed and/or ordered clinical lab results Yes  Reviewed and/or ordered radiology tests Yes   Reviewed and/or ordered other diagnostic tests Yes  Made a decision to obtain old records Yes  Reviewed and summarized old records Yes      Venus Vila was counseled regarding symptoms of current diagnosis, course and complications of disease if inadequately treated, side effects of medications, diagnosis, treatment options, and prognosis, risks, benefits, complications, and alternatives of treatment, labs, imaging and other studies and treatment targets and goals. He understands instructions and counseling. Total time spent 40 minutes , more than 50 % if this time was spent on face to face counseling. Return if symptoms worsen or fail to improve.     Please note that some or all of this report was generated using voice recognition software. Please notify me in case of any questions about the content of this document, as some errors in transcription may have occurred .

## 2022-04-14 ENCOUNTER — TELEPHONE (OUTPATIENT)
Dept: RHEUMATOLOGY | Age: 52
End: 2022-04-14

## 2022-04-14 DIAGNOSIS — I10 PRIMARY HYPERTENSION: ICD-10-CM

## 2022-04-14 DIAGNOSIS — M45.7 ANKYLOSING SPONDYLITIS OF LUMBOSACRAL REGION (HCC): ICD-10-CM

## 2022-04-14 DIAGNOSIS — R06.02 SHORTNESS OF BREATH: ICD-10-CM

## 2022-04-14 LAB
A/G RATIO: 1.3 (ref 1.1–2.2)
ALBUMIN SERPL-MCNC: 4.3 G/DL (ref 3.4–5)
ALP BLD-CCNC: 99 U/L (ref 40–129)
ALT SERPL-CCNC: 28 U/L (ref 10–40)
ANION GAP SERPL CALCULATED.3IONS-SCNC: 14 MMOL/L (ref 3–16)
ANTI-THYROGLOB ABS: 13 IU/ML
AST SERPL-CCNC: 24 U/L (ref 15–37)
BILIRUB SERPL-MCNC: 0.6 MG/DL (ref 0–1)
BUN BLDV-MCNC: 14 MG/DL (ref 7–20)
CALCIUM SERPL-MCNC: 9.8 MG/DL (ref 8.3–10.6)
CHLORIDE BLD-SCNC: 102 MMOL/L (ref 99–110)
CO2: 24 MMOL/L (ref 21–32)
CREAT SERPL-MCNC: 0.9 MG/DL (ref 0.9–1.3)
FOLLICLE STIMULATING HORMONE: 5.8 MIU/ML
GFR AFRICAN AMERICAN: >60
GFR NON-AFRICAN AMERICAN: >60
GLUCOSE BLD-MCNC: 103 MG/DL (ref 70–99)
LUTEINIZING HORMONE: 4.2 MIU/ML
POTASSIUM SERPL-SCNC: 4.5 MMOL/L (ref 3.5–5.1)
SODIUM BLD-SCNC: 140 MMOL/L (ref 136–145)
T4 FREE: 1.3 NG/DL (ref 0.9–1.8)
THYROID PEROXIDASE (TPO) ABS: 8 IU/ML
TOTAL PROTEIN: 7.6 G/DL (ref 6.4–8.2)
TSH SERPL DL<=0.05 MIU/L-ACNC: 0.97 UIU/ML (ref 0.27–4.2)

## 2022-04-14 RX ORDER — ETODOLAC 400 MG/1
400 TABLET, FILM COATED ORAL 2 TIMES DAILY
Qty: 60 TABLET | Refills: 2 | Status: SHIPPED | OUTPATIENT
Start: 2022-04-14 | End: 2022-06-30

## 2022-04-14 RX ORDER — ADALIMUMAB 40MG/0.4ML
40 KIT SUBCUTANEOUS
Qty: 2 EACH | Refills: 2 | Status: SHIPPED | OUTPATIENT
Start: 2022-04-14 | End: 2022-04-29

## 2022-04-14 NOTE — TELEPHONE ENCOUNTER
Dr Kaylee Mark spoke with pt. Pt has tried Enbrel but experienced tightness in throat and advised not to continue Enbrel. Pt will try Humira. Vob initiated.

## 2022-04-14 NOTE — TELEPHONE ENCOUNTER
VOB humira. DX ankylosing spondylitis, M45.7. Pt has tried Enbrel but had adverse reaction and stopped.

## 2022-04-14 NOTE — TELEPHONE ENCOUNTER
PA COVER MY MEDS  Medication: Humira Pen (CF) 40MG/0.4ML pen-injector kit  Key:[id number]  Status:Message in CMM  This request cannot be processed due to the member's coverage has terminated.

## 2022-04-16 LAB — TSH RECEPTOR AB: 1.11 IU/L

## 2022-04-17 LAB
IGF-1 (INSULIN-LIKE GROWTH I): 194 NG/ML (ref 67–225)
INSULIN-LIKE GROWTH FACTOR-1 Z-SCORE: 1.3

## 2022-04-18 LAB
THYROID STIMULATING IMMUNOGLOBULIN: <0.1 IU/L
TRYPTASE: 5.2 UG/L

## 2022-04-19 LAB
REASON FOR REJECTION: NORMAL
REJECTED TEST: NORMAL
SEROTONIN, WB: 168 NG/ML (ref 50–200)
SEX HORMONE BINDING GLOBULIN: 22 NMOL/L (ref 11–80)
TESTOSTERONE FREE-NONMALE: 93.9 PG/ML (ref 47–244)
TESTOSTERONE TOTAL: 376 NG/DL (ref 220–1000)

## 2022-04-20 ENCOUNTER — TELEPHONE (OUTPATIENT)
Dept: ENDOCRINOLOGY | Age: 52
End: 2022-04-20

## 2022-04-20 NOTE — TELEPHONE ENCOUNTER
Please send to the lab personnel and Venda Dad to see if she can talk to the lab about the specimen as well.

## 2022-04-20 NOTE — TELEPHONE ENCOUNTER
Call from Marty Zhao / [facility] about a rejected specimen. Copied from note    Rejected Test 80,216    Comment: Catecholamines Panel, plasma   Reason for Rejection see below    Comment: Unable to perform testing; specimen quantity not sufficient. To perform testing the specimen will need to be recollected.  QNS   minimum volume required is 2.1 mL. Pour off tube contains 1.1 mL.       The lab states they will call pt    # [phone]

## 2022-04-29 ENCOUNTER — TELEPHONE (OUTPATIENT)
Dept: ADMINISTRATIVE | Age: 52
End: 2022-04-29

## 2022-04-29 ENCOUNTER — OFFICE VISIT (OUTPATIENT)
Dept: CARDIOLOGY CLINIC | Age: 52
End: 2022-04-29
Payer: COMMERCIAL

## 2022-04-29 VITALS
SYSTOLIC BLOOD PRESSURE: 136 MMHG | OXYGEN SATURATION: 97 % | WEIGHT: 257 LBS | BODY MASS INDEX: 32.98 KG/M2 | HEART RATE: 64 BPM | HEIGHT: 74 IN | DIASTOLIC BLOOD PRESSURE: 80 MMHG

## 2022-04-29 DIAGNOSIS — M45.7 ANKYLOSING SPONDYLITIS OF LUMBOSACRAL REGION (HCC): ICD-10-CM

## 2022-04-29 DIAGNOSIS — I10 PRIMARY HYPERTENSION: ICD-10-CM

## 2022-04-29 DIAGNOSIS — R94.31 ABNORMAL EKG: ICD-10-CM

## 2022-04-29 DIAGNOSIS — R06.02 SHORTNESS OF BREATH: ICD-10-CM

## 2022-04-29 DIAGNOSIS — I10 PRIMARY HYPERTENSION: Primary | ICD-10-CM

## 2022-04-29 DIAGNOSIS — R06.09 CHRONIC DYSPNEA: ICD-10-CM

## 2022-04-29 DIAGNOSIS — G47.33 OSA (OBSTRUCTIVE SLEEP APNEA): ICD-10-CM

## 2022-04-29 DIAGNOSIS — R06.09 CHRONIC DYSPNEA: Primary | ICD-10-CM

## 2022-04-29 LAB — PRO-BNP: 8 PG/ML (ref 0–124)

## 2022-04-29 PROCEDURE — 99214 OFFICE O/P EST MOD 30 MIN: CPT | Performed by: INTERNAL MEDICINE

## 2022-04-29 NOTE — PATIENT INSTRUCTIONS
Patient Education        Learning About Low-Sodium Foods  What foods are low in sodium? The foods you eat contain nutrients, such as vitamins and minerals. Sodium is a nutrient. Your body needs the right amount to stay healthy and work as it should. You can use the list below to help you make choices about which foodsto eat. Fruits   Fresh, frozen, canned, or dried fruit  Vegetables   Fresh or frozen vegetables, with no added salt   Canned vegetables, low-sodium or with no added salt  Grains   Bagels without salted tops   Cereal with no added salt   Corn tortillas   Crackers with no added salt   Oatmeal, cooked without salt   Popcorn with no salt   Pasta and noodles, cooked without salt   Rice, cooked without salt   Unsalted pretzels  Dairy and dairy alternatives   Butter, unsalted   Cream cheese   Ice cream   Milk   Soy milk  Meats and other protein foods   Beans and peas, canned with no salt   Eggs   Fresh fish (not smoked)   Fresh meats (not smoked or cured)   Nuts and nut butter, prepared without salt   Poultry, not packaged with sodium solution   Tofu, unseasoned   Tuna, canned without salt  Seasonings   Garlic   Herbs and spices   Lemon juice   Mustard   Olive oil   Salt-free seasoning mixes   Vinegar  Work with your doctor to find out how much of this nutrient you need. Dependingon your health, you may need more or less of it in your diet. Where can you learn more? Go to https://Monkey Biznessalyseeb.healthCrestock. org and sign in to your [employer] account. Enter P521 in the [facility] box to learn more about \"Learning About Low-Sodium Foods. \"     If you do not have an account, please click on the \"Sign Up Now\" link. Current as of: September 8, 2021               Content Version: 13.2  © 5528-2037 Healthwise, Incorporated. Care instructions adapted under license by [facility] ([facility]).  If you have questions about a medical condition or this instruction, always ask your healthcare professional. [address] any warranty or liability for your use of this information. Patient Education        Walking for Exercise: Care Instructions  Your Care Instructions     Walking is one of the easiest ways to get the exercise you need for good health. A brisk, 30-minute walk each day can help you feel better and have more energy. It can help you lower your risk of disease. Walking can help you keepyour bones strong and your heart healthy. Check with your doctor before you start a walking plan if you have heart problems, other health issues, or you have not been active in a long time. Follow your doctor's instructions for safe levels of exercise. Follow-up care is a key part of your treatment and safety. Be sure to make and go to all appointments, and call your doctor if you are having problems. It's also a good idea to know your test results and keep alist of the medicines you take. How can you care for yourself at home? Getting started  [facility] slowly and set a short-term goal. For example, walk for 5 or 10 minutes every day.  Bit by bit, increase the amount you walk every day. Try for at least 30 minutes on most days of the week. You also may want to swim, bike, or do other activities.  If finding enough time is a problem, it's fine to be active in shorter periods of time throughout your day.  To get the heart-healthy benefits of walking, you need to walk briskly enough to increase your heart rate and breathing, but not so fast that you can't talk comfortably.  Wear comfortable shoes that fit well and provide good support for your feet and ankles. Staying with your plan   After you've made walking a habit, set a longer-term goal. You may want to set a goal of walking briskly for longer or walking farther. Experts say to do 2½ hours (150 minutes) of moderate activity a week. A faster heartbeat is what defines moderate-level activity.    To stay motivated, walk with friends, coworkers, or pets.  Use a phone jozef or pedometer to track your steps each day. Set a goal to increase your steps. When you reach that goal, set a higher goal.   If the weather keeps you from walking outside, go for walks at the mall with a friend. Local schools and churches may have indoor gyms where you can walk. Fitting a walk into your workday  [facility] several blocks away from work, or get off the bus a few stops early.  Use the stairs instead of the elevator, at least for a few floors.  Suggest holding meetings with colleagues during a walk inside or outside the building.  Use the restroom that is the farthest from your desk or workstation.  Use your morning and afternoon breaks to take quick 15 minutes walks. Staying safe   Know your surroundings. Walk in a well-lighted, safe place. If it's dark, walk with a partner. Wear light-colored clothing. If you can, buy a vest or jacket that reflects light.  Carry a cell phone for emergencies.  Drink plenty of water. Take a water bottle with you when you walk. This is very important if it is hot out.  Be careful not to slip on wet or icy ground. You can buy \"grippers\" for your shoes to help keep you from slipping.  Pay attention to your walking surface. Use sidewalks and paths.  If you have health issues such as asthma, COPD, or heart problems, or if you haven't been active for a long time, check with your doctor before you start a new activity. Where can you learn more? Go to https://Sport/Lifekelsi.healthPhatNoise. org and sign in to your [employer] account. Enter R159 in the [facility] box to learn more about \"Walking for Exercise: Care Instructions. \"     If you do not have an account, please click on the \"Sign Up Now\" link. Current as of: May 12, 2021               Content Version: 13.2  © 7008-7861 Healthwise, Incorporated. Care instructions adapted under license by [facility] ([facility]).  If you have questions about a medical condition or this instruction, always ask your healthcare professional. [address] any warranty or liability for your use of this information. Patient Education        Learning About Healthy Weight  What is a healthy weight? A healthy weight is the weight at which you feel good about yourself and have energy for work and play. It's also one that lowers your risk for healthproblems. What can you do to stay at a healthy weight? It can be hard to stay at a healthy weight, especially when fast food, vending-machine snacks, and processed foods are so easy to find. And with your busy lifestyle, activity may be low on your list of things to do. But stayingat a healthy weight may be easier than you think. Here are some dos and don'ts for staying at a healthy weight. Do eat healthy foods  The kinds of foods you eat have a big impact on both your weight and your health. Reaching and staying at a healthy weight is not about going on a diet. It's about making healthier food choices every day and changing your diet forgood. Healthy eating means eating a variety of foods so that you get all the nutrients you need. Your body needs protein, carbohydrate, and fats for energy. They keep your heart beating, your brain active, and your muscles working. On most days, try to eat from each food group. This means eating a variety of:   Whole grains, such as whole wheat breads and pastas.  Fruits and vegetables.  Dairy products, such as low-fat milk, yogurt, and cheese.  Lean proteins, such as all types of fish, chicken without the skin, and beans. Don't have too much or too little of one thing. All foods, if eaten inmoderation, can be part of healthy eating. Even sweets can be okay. If your favorite foods are high in fat, salt, sugar, or calories, limit howoften you eat them. Eat smaller servings, or look for healthy substitutes.   Do watch what you eat  Many people eat more than their bodies need. Part of staying at a healthy weight means learning how much food you really need from day to day and not eating more than that. Even with healthy foods, eating too much can make yougain weight. Having a well-balanced diet means that you eat enough, but not too much, and that your food gives you the nutrients you need to stay healthy. So listen toyour body. Eat when you're hungry. Stop when you feel satisfied. It's a good idea to have healthy snacks ready for when you get hungry. Keep healthy snacks with you at work, in your car, and at home. If you have a healthy snack easily available, you'll be less likely to pick a candy bar orbag of chips from a vending machine instead. Some healthy snacks you might want to keep on hand are fruit, low-fat yogurt, string cheese, low-fat microwave popcorn, raisins and other dried fruit, nuts,whole wheat crackers, pretzels, carrots, celery sticks, and broccoli. Do some physical activity  A big part of reaching and staying at a healthy weight is being active. When you're active, you burn calories. This makes it easier to reach and stay at a healthy weight. When you're active on a regular basis, your body burns more calories, even when you're at rest. Being active helps you lose fat andbuild lean muscle. Try to be active for at least 1 hour every day. This may sound like a lot, but it's okay to be active in smaller blocks of time that add up to 1 hour a day. Any activity that makes your heart beat faster and keeps it there for a while counts. A brisk walk, run, or swim will get your heart beating faster. So will climbing stairs, shooting baskets, or cycling. Even some household chores likevacuuming and mowing the lawn will get your heart rate up. Pick activities that you enjoyones that make your heart beat faster, your muscles stronger, and your muscles and joints more flexible. If you find more than one thing you like doing, do them all.  You don't have to do the same thingevery day. Don't diet  Diets don't work. Diets are temporary. Because you give up so much when you diet, you may be hungry and think about food all the time. And after you stop dieting, you also may overeat to make up for what you missed. Most people who diet end up gainingback the pounds they lostand more. Remember that healthy bodies come in lots of shapes and sizes. Everyone can gethealthier by eating better and being more active. Where can you learn more? Go to https://TrusteerpeCareSpotter.Strategic Blue. org and sign in to your [employer] account. Enter 722 [id number] in the [employer] box to learn more about \"Learning About Healthy Weight. \"     If you do not have an account, please click on the \"Sign Up Now\" link. Current as of: December 27, 2021               Content Version: 13.2  © 5019-0688 [employer]. Care instructions adapted under license by [facility] ([facility]). If you have questions about a medical condition or this instruction, always ask your healthcare professional. [place] [address] any warranty or liability for your use of this information. Patient Education        Elevated Blood Pressure: Care Instructions  Your Care Instructions    Blood pressure is a measure of how hard the blood pushes against the walls of your arteries. It's normal for blood pressure to go up and down throughout the day. But if it stays up over time, you have high blood pressure. Two numbers tell you your blood pressure. The first number is the systolic pressure. It shows how hard the blood pushes when your heart is pumping. The second number is the diastolic pressure. It shows how hard the blood pushes between heartbeats, when your heart is relaxed and filling with blood. An ideal blood pressure in adults is less than 120/80 (say \"120 over 80\"). High blood pressure is 140/90 or higher.  You have high blood pressure if your top number is 140 or higher or your bottom number is 90 or higher, or both. The main test for high blood pressure is simple, fast, and painless. To diagnose high blood pressure, your doctor will test your blood pressure at different times. After testing your blood pressure, your doctor may ask you to test it again when you are home. If you are diagnosed with high blood pressure, you can work with your doctor to make a long-term plan to manage it. Follow-up care is a key part of your treatment and safety. Be sure to make and go to all appointments, and call your doctor if you are having problems. It's also a good idea to know your test results and keep a list of the medicines you take. How can you care for yourself at home? · Do not smoke. Smoking increases your risk for heart attack and stroke. If you need help quitting, talk to your doctor about stop-smoking programs and medicines. These can increase your chances of quitting for good. · Stay at a healthy weight. · Try to limit how much sodium you eat to less than 2,300 milligrams (mg) a day. Your doctor may ask you to try to eat less than 1,500 mg a day. · Be physically active. Get at least 30 minutes of exercise on most days of the week. Walking is a good choice. You also may want to do other activities, such as running, swimming, cycling, or playing tennis or team sports. · Avoid or limit alcohol. Talk to your doctor about whether you can drink any alcohol. · Eat plenty of fruits, vegetables, and low-fat dairy products. Eat less saturated and total fats. · Learn how to check your blood pressure at home. When should you call for help? Call your doctor now or seek immediate medical care if:    · Your blood pressure is much higher than normal (such as 180/110 or higher).     · You think high blood pressure is causing symptoms such as:  ¨ Severe headache.   ¨ Blurry vision.    Watch closely for changes in your health, and be sure to contact your doctor if:    · You do not get better as expected. Where can you learn more? Go to https://chpepiceweb.trueAnthem. org and sign in to your EDUonGot account. Enter X200 in the [employer] box to learn more about \"Elevated Blood Pressure: Care Instructions. \"     If you do not have an account, please click on the \"Sign Up Now\" link. Current as of: May 10, 2017  Content Version: 11.6  © 2251-4608 twtrland, Incorporated. Care instructions adapted under license by [facility] ([facility]). If you have questions about a medical condition or this instruction, always ask your healthcare professional. [address] any warranty or liability for your use of this information.

## 2022-04-29 NOTE — TELEPHONE ENCOUNTER
HUMIRA PEN 40 MG/0.4ML NO CITR  The prior authorization request has been approved and is in queue to be reprocessed.   PA Date Range: 04/20/2022 through 04/20/2023  PA Number: [id number]

## 2022-04-29 NOTE — PROGRESS NOTES
[facility]      Cardiology Progress Note    Paula De Guzman  1970 April 29, 2022    Referring Physician: Wendy Bazzi CNP  Reason for Referral: SOB, HTN    CC: \"I still have shortness of breath with activity, I can't get a deep breath. \"     HPI:  The patient is 50 y.o. male with a past medical history significant for HTN, SOURAV, chronic RUQ pain evaluated by GI, GERD and anxiety. He denies tobacco use and rarely drinks Etoh. He has a family hx of heart disease. He presented 1/14/22 for evaluation of chronic, ongoing shortness of breath. He states that he has problems breathing and taking a full breath. He was treated for bronchitis without improvement. He says \"90% of time I cannot complete a breath. \" He also reports headaches and epistaxis which improved after stopping zyrtec D. He also reports chronic RUQ pain, chronic ABD bloating and chronic dyspnea worsened by eating. He is also SOB at night at times. He is concerned that his eyelids and lips also swell. He is not sure if his SOB is related to his ABD pain. His symptoms have been chronic since 6/2020. He feels his NUÑEZ has somewhat improved. The patient denies exertional chest pain/tightness/pressure, palpitations, dizziness, syncope, worsening leg swelling and PND. Today, he states he still cant get get a deep breath, NUÑEZ with activity, he states his home monitor reveals elevated BP at times. Head stuffiness, nose bleeds. He also reports that he has been evaluated per Dr. Yolanda Jacques with no identifiable etiology. Patient denies exertional chest pain/pressure, dyspnea at rest, changes in NUÑEZ, PND, orthopnea, palpitations, lightheadedness, weight changes, changes in LE edema, and syncope. He admits to medical therapy compliance and tolerating.        Past Medical History:   Diagnosis Date    [facility]ylosing spondylitis ([facility] [address]     Fatty liver  htn      Past Surgical History:   Procedure Laterality Date    HERNIA REPAIR Right 2000     Family History Problem Relation Age of Onset    Liver Disease Mother              Atrial Fibrillation Father     Diabetes type 2  Father     Arthritis Father     Hypertension Father      Social History     Tobacco Use    Smoking status: Never Smoker    Smokeless tobacco: Never Used   Vaping Use    Vaping Use: Never used   Substance Use Topics    Alcohol use: Not Currently     Alcohol/week: 0.0 standard drinks     Comment:      Drug use: Never       Allergies   Allergen Reactions    Pcn [Penicillins] Hives     Current Outpatient Medications   Medication Sig Dispense Refill    etodolac (LODINE) 400 MG tablet Take 1 tablet by mouth 2 times daily 60 tablet 2    PHENYLEPHRINE HCL NA by Nasal route Several times a day per patient      Cetirizine HCl (ZYRTEC PO) Take by mouth as needed       No current facility-administered medications for this visit. Review of Systems:  · Constitutional: no unanticipated weight loss. There's been no change in energy level, sleep pattern, or activity level. No fevers, chills. · Eyes: No visual changes or diplopia. No scleral icterus. · ENT: No Headaches, hearing loss or vertigo. No mouth sores or sore throat. · Cardiovascular: as reviewed in HPI  · Respiratory: No cough or wheezing, no sputum production. No hematemesis. · Gastrointestinal: No abdominal pain, appetite loss, blood in stools. No change in bowel or bladder habits. · Genitourinary: No dysuria, trouble voiding, or hematuria. · Musculoskeletal:  No gait disturbance, no joint complaints. · Integumentary: No rash or pruritis. · Neurological: No headache, diplopia, change in muscle strength, numbness or tingling. · Psychiatric: No anxiety or depression. · Endocrine: No temperature intolerance. No excessive thirst, fluid intake, or urination. No tremor. · Hematologic/Lymphatic: No abnormal bruising or bleeding, blood clots or swollen lymph nodes. · Allergic/Immunologic: No nasal congestion or hives.     Physical Exam:   /80   Pulse 64   Ht 6' 2\" (1.88 m)   Wt 257 lb (116.6 kg)   SpO2 97%   BMI 33.00 kg/m²   Wt Readings from Last 3 Encounters:   04/29/22 257 lb (116.6 kg)   04/11/22 260 lb (117.9 kg)   03/03/22 264 lb (119.7 kg)     Constitutional: He is oriented to person, place, and time. He appears well-developed and well-nourished. In no acute distress. Head: Normocephalic and atraumatic. Pupils equal and round. Neck: Neck supple. No JVP or carotid bruit appreciated. No mass and no thyromegaly present. No lymphadenopathy present. Cardiovascular: Normal rate. Normal heart sounds. Exam reveals no gallop and no friction rub. No murmur heard. Pulmonary/Chest: Effort normal and breath sounds normal. No respiratory distress. He has no wheezes, rhonchi or rales. Abdominal: Soft, non-tender. Bowel sounds are normal. He exhibits no organomegaly, mass or bruit. Extremities: No edema, cyanosis, or clubbing. Pulses are 2+ radial/dorsalis pedis/posterior tibial/carotid bilaterally. Neurological: No gross cranial nerve deficit. Coordination normal.   Skin: Skin is warm and dry. There is no rash or diaphoresis. Psychiatric: He has a normal mood and affect. His speech is normal and behavior is normal.     Lab Review:   Lab Results   Component Value Date    TRIG 56 11/22/2021    HDL 51 11/22/2021    LDLCALC 95 11/22/2021    LABVLDL 11 11/22/2021      Lab Results   Component Value Date    BUN 14 04/14/2022    CREATININE 0.9 04/14/2022       EKG Interpretation:   8/2020: EKG TCH interpreted as sinus rhythm, Probable lateral infarct, age indeterminate   1/14/22: NSR, Old inferior-apical infarct. 4/29/22:     Image Review:     CT pulmonary 9/2020 no PE      Stress ECHO 7/2020 TCH  - Normal echo stress.        PFT's done in past per pt  -report unavailable      Echo: 1/21/22   Normal left ventricle size, wall thickness, and systolic function with an   estimated ejection fraction of 60-65%.  No regional wall motion abnormalities   are seen. Normal diastolic function. The right ventricle is normal in size and function. Trivial tricuspid regurgitation. The ascending aorta is mildly dilated measuring 3.6 cm. Nuclear: 1/18/22      Summary    Normal myocardial perfusion study.    Normal LV size and systolic function.        Normal exercise stress test.    Good exercise capacity for age.        Overall findings represent a low risk study. Assessment/Plan:     Chronic dyspnea/RUQ ABD pain  -Patient presented as new patient  1/14/22 due to symptoms of chest pain and shortness of breath. His symptoms are chronic in nature.  -His work-up for PE was negative in 2020 so was his stress echocardiogram showing no evidence of reversible ischemia.  -Stretch of esophagus with Dr. Jose Humphrey   -mother with autoimmune disease.   -1/18/22 exercise myoview stress test normal   -1/21/22 echo normal   -BP normal off HCTZ    -Today, he continues to reports that he gets NUÑEZ with activity, can't catch a deep breath with no changes since our last visit 2 months ago.   -physical exam again normal  -off HCTZ therapy   -instructed to stop in the office with his home BP monitor to check accuracy. -proBNP and 24 hour ambulatory BP monitoring to further assess.   -may consider evaluating his diaphragm if above testing is normal. .     HTN. -BP goal < 130/80  -He has been off of HCTZ few months  -He also took himself off of ZyrtecD. -Controlled today but reports home monitor elevated at times. -BMP 4/14/22 stable  -Encouraged strict low salt diet, walking program, weight management.   -instructed to stop in the office with his home BP monitor to check accuracy.   -Will continue to monitor    Abnormal ECG  -His symptoms are not consistent with underlying ischemic heart disease. .  -Pseudo infarct pattern is suspected  -1/2021 completed Echo and stress which were both normal     SOURAV.   -Working on compliance with CPAP   -He was prescribed 4-5 years.   -Follow up routinely with sleep medicine. Ankylosing spondylitis   -Mother had autoimmune disease AS as well as Hashimoto. Seasonal allergies  Head stuffiness  Nose bleeds  Encouraged to f/u with PCP or ENT    We will call with test results and plan  f/u in 3-4 months        Thank you very much for allowing me to participate in the care of your patient. Please do not hesitate to contact me if you have any questions. Sincerely,  Wilbur Shane MD      [place] [address]  Ph: [phone]  Fax: [phone]    This note was scribed in the presence of Dr. Dami Marx MD by Laina Phillips RN.

## 2022-05-01 LAB
CATECHOLAMINE INTERPRETATION, PLASMA: NORMAL
DOPAMINE PLASMA: <20 PG/ML (ref 0–20)
EPINEPHRINE PLASMA: 26 PG/ML (ref 10–200)
NOREPINEPHRINE: 308 PG/ML (ref 80–520)

## 2022-05-03 ENCOUNTER — TELEPHONE (OUTPATIENT)
Dept: ENDOCRINOLOGY | Age: 52
End: 2022-05-03

## 2022-05-03 ENCOUNTER — TELEPHONE (OUTPATIENT)
Dept: PULMONOLOGY | Age: 52
End: 2022-05-03

## 2022-05-03 DIAGNOSIS — R06.02 SOB (SHORTNESS OF BREATH): Primary | ICD-10-CM

## 2022-05-03 NOTE — TELEPHONE ENCOUNTER
Please advise patient 24-hour urine cortisol results are not back yet, thyroid function test and rest of the hormone testing was normal suggesting that his symptoms are not related to any endocrine causes, liver function test and kidney test were within normal limits too.   Testosterone testing was within normal limits too

## 2022-05-03 NOTE — TELEPHONE ENCOUNTER
Patient calling stating he can't take deep breaths. He states he can check it and with exertion it is going down to 85%. He hasn't been in for over a year which is about the time this all started. He thought it might me COVID but hasn't gone away. I made him an appt for 5/23/22 but wanted to know if there were any suggestions in the mean time.

## 2022-05-04 ENCOUNTER — TELEPHONE (OUTPATIENT)
Dept: RHEUMATOLOGY | Age: 52
End: 2022-05-04

## 2022-05-04 LAB
CORTISOL (UR), FREE: 70.7 UG/D
CORTISOL URINE, FREE (/G CRT): 29.57 UG/G CRT
CORTISOL,F,UG/L,U: 54.4 UG/L
CREATININE 24 HOUR URINE: 2392 MG/D (ref 1000–2500)
CREATININE URINE: 184 MG/DL
HOURS COLLECTED: 24
INTERPRETATION: ABNORMAL
URINE TOTAL VOLUME: 1300

## 2022-05-04 NOTE — TELEPHONE ENCOUNTER
Agree with follow up. He is not an active patient but chest X-ray ordered. Ok to offer follow up this week.

## 2022-05-05 ENCOUNTER — HOSPITAL ENCOUNTER (OUTPATIENT)
Dept: NON INVASIVE DIAGNOSTICS | Age: 52
Discharge: HOME | End: 2022-05-05
Payer: COMMERCIAL

## 2022-05-05 ENCOUNTER — TELEPHONE (OUTPATIENT)
Dept: PULMONOLOGY | Age: 52
End: 2022-05-05

## 2022-05-05 ENCOUNTER — OFFICE VISIT (OUTPATIENT)
Dept: PULMONOLOGY | Age: 52
End: 2022-05-05
Payer: COMMERCIAL

## 2022-05-05 VITALS
WEIGHT: 268 LBS | SYSTOLIC BLOOD PRESSURE: 136 MMHG | DIASTOLIC BLOOD PRESSURE: 80 MMHG | TEMPERATURE: 98.7 F | HEART RATE: 80 BPM | BODY MASS INDEX: 34.39 KG/M2 | HEIGHT: 74 IN | RESPIRATION RATE: 16 BRPM | OXYGEN SATURATION: 97 %

## 2022-05-05 DIAGNOSIS — R06.02 SOB (SHORTNESS OF BREATH): Primary | ICD-10-CM

## 2022-05-05 DIAGNOSIS — Z14.8 ALPHA-1-ANTITRYPSIN DEFICIENCY CARRIER: ICD-10-CM

## 2022-05-05 DIAGNOSIS — R06.02 SHORTNESS OF BREATH: ICD-10-CM

## 2022-05-05 DIAGNOSIS — G47.33 OSA (OBSTRUCTIVE SLEEP APNEA): ICD-10-CM

## 2022-05-05 PROCEDURE — 99214 OFFICE O/P EST MOD 30 MIN: CPT | Performed by: INTERNAL MEDICINE

## 2022-05-05 PROCEDURE — 93788 AMBL BP MNTR W/SW A/R: CPT

## 2022-05-05 PROCEDURE — 93786 AMBL BP MNTR W/SW REC ONLY: CPT

## 2022-05-05 ASSESSMENT — SLEEP AND FATIGUE QUESTIONNAIRES
HOW LIKELY ARE YOU TO NOD OFF OR FALL ASLEEP WHILE SITTING QUIETLY AFTER LUNCH WITHOUT ALCOHOL: 2
HOW LIKELY ARE YOU TO NOD OFF OR FALL ASLEEP IN A CAR, WHILE STOPPED FOR A FEW MINUTES IN TRAFFIC: 1
HOW LIKELY ARE YOU TO NOD OFF OR FALL ASLEEP WHILE SITTING INACTIVE IN A PUBLIC PLACE: 1
HOW LIKELY ARE YOU TO NOD OFF OR FALL ASLEEP WHEN YOU ARE A PASSENGER IN A CAR FOR AN HOUR WITHOUT A BREAK: 2
HOW LIKELY ARE YOU TO NOD OFF OR FALL ASLEEP WHILE SITTING AND READING: 2
HOW LIKELY ARE YOU TO NOD OFF OR FALL ASLEEP WHILE WATCHING TV: 2
HOW LIKELY ARE YOU TO NOD OFF OR FALL ASLEEP WHILE LYING DOWN TO REST IN THE AFTERNOON WHEN CIRCUMSTANCES PERMIT: 3
HOW LIKELY ARE YOU TO NOD OFF OR FALL ASLEEP WHILE SITTING AND TALKING TO SOMEONE: 1
ESS TOTAL SCORE: 14

## 2022-05-05 NOTE — PATIENT INSTRUCTIONS
he  was advised of the [facility] recall and discussed the risk of untreated sleep apnea vs risk from device. he  will call DME and work to get device repaired or replaced. Https://www. [email or web address]. [employer]. [employer]/    [phone]       inline filter for cpap chun          epap 8 ipap 16 pressure support 5

## 2022-05-05 NOTE — ASSESSMENT & PLAN NOTE
Using nightly. [employer] recall discussed. Recall information completed today. Titration is not required during this visit. PAP download information:  Usage > 4 hours  73 %   Pressure setting  autobip  cwp    AHI with usage  1.7     See media tab for full download data. Leighannmikel Ryan  is deriving benefit from PAP demonstrated by improved [place], AHI, symptoms.

## 2022-05-05 NOTE — TELEPHONE ENCOUNTER
----- Message from Zoltan Capone MD sent at 5/5/2022  9:49 AM EDT -----  Regarding: moving his appointment today  I have a bronch a 1 pm.  Need him to move up or back.   Can do now until 12 or ~ 130 pm.      Thanks

## 2022-05-05 NOTE — ASSESSMENT & PLAN NOTE
We discussed the significance and likely insignificant to this.   Repeat pulmonary function test to assess

## 2022-05-05 NOTE — PROGRESS NOTES
Patient arrived for ambulatory blood pressure monitor application. Adult plus cuff applied to left arm.  Patient will return 5/5 at 08:00 for removal.

## 2022-05-05 NOTE — PROGRESS NOTES
REASON FOR CONSULTATION/CC:  shortness of breath   Chief Complaint   Patient presents with    Shortness of Breath        Consult at request of   Mark Jean MD for sob     PCP: Mark Jean MD    HISTORY OF PRESENT ILLNESS: Chriss Cantu is a 50y.o. year old male with a history of ankylosis spondylitis who presents          shortness of breath  He continues to have shortness of breath. No improvement with inhaler medications. Work-up has been negative. He continues to believe that he is possibly a long-haul or from COVID-19 without any evidence to support this. No findings on chest x-ray. He has received 1 out of 2 COVID-19 vaccinations without side effect. Current history     SOURAV  Using CPAP nightly. No issues. Not using humidifier. shortness of breath   He still complains of shortness of breath  He states he is limited form getter deep breaths and issues with breathing. blood pressure   He is currently on blood pressure cuff. Mother with pulmonary hypertension     He complains of life long bloody nose but slightly worse recently. Using phenylephrine /     complains of being hot and red in the face. States has been present for the last year. Ankylosing spondylitis  Follow with rheumatology. Objective:   PHYSICAL EXAM:  Blood pressure 136/80, pulse 80, temperature 98.7 °F (37.1 °C), temperature source Infrared, resp. rate 16, height 6' 2\" (1.88 m), weight 268 lb (121.6 kg), SpO2 97 %.'  Gen: No distress. Body mass index is 34.41 kg/m². Eyes: PERRL. No sclera icterus. No conjunctival injection. ENT: No discharge. Pharynx clear. External appearance of ears and nose normal. Mallampati  3  Neck: Trachea midline. No obvious mass. Resp: No accessory muscle use. No crackles. No wheezes. No rhonchi. CV: Regular rate. Regular rhythm. No murmur or rub. No edema. GI: Non-tender. Non-distended. No hernia.    Skin: Warm, dry, normal texture and turgor. No nodule on exposed extremities. Erythema of neck. Lymph: No cervical LAD. No supraclavicular LAD. M/S: No cyanosis. No clubbing. No joint deformity. Neuro: Moves all four extremities. Psych: Oriented x 3. No anxiety. Awake. Alert. Intact judgement and insight. .         Data Reviewed:        Assessment:     · Self diagnosed asthma  · Alpha-1 PiMZ level 77  · Fatty liver  · Ankylosing spondylitis  · Hypersomnia with strong family history of sleep apnea  · Rash  · Shortness of breath      Plan:      Problem List Items Addressed This Visit     Shortness of breath      He continues have significant complaints of shortness of breath. Of note, he has had a normal pulmonary function test echocardiogram, stress test and chest x-ray with CT chest.  He has been evaluated at [address], [facility]. Obesity would be a contributing factor to this. He is concerned about pulmonary hypertension. Echocardiogram January 2022 did not demonstrate any signs of pulmonary hypertension. We discussed the reveals study and advised him to discuss with cardiology if he wishes to pursue a right heart catheterization. Repeat pulmonary function test now along with 6-minute walk. Patient states he has exertional hypoxemia. Currently 97 percent. If he has exertional hypoxemia, consider bubble echocardiogram.         SOURAV (obstructive sleep apnea)      Using nightly. Mell De La Fuente recall discussed. Recall information completed today. Titration is not required during this visit. PAP download information:  Usage > 4 hours  73 %   Pressure setting  autobip  cwp    AHI with usage  1.7     See media tab for full download data. Cornelia العلي  is deriving benefit from PAP demonstrated by improved [place], AHI, symptoms. Alpha-1-antitrypsin deficiency carrier      We discussed the significance and likely insignificant to this.   Repeat pulmonary function test to assess Other Visit Diagnoses     SOB (shortness of breath)    -  Primary    Relevant Orders    6 Minute Walk Test    Full PFT Study With Bronchodilator    Bronchial Challenge                This note was transcribed using Dragon Dictation software. Please disregard any translational errors.     Edgardo Ayala Pulmonary, Sleep and [facility] [phone]

## 2022-05-05 NOTE — ASSESSMENT & PLAN NOTE
He continues have significant complaints of shortness of breath. Of note, he has had a normal pulmonary function test echocardiogram, stress test and chest x-ray with CT chest.  He has been evaluated at [facility], [address] [place] [address], [facility]. Obesity would be a contributing factor to this. He is concerned about pulmonary hypertension. Echocardiogram January 2022 did not demonstrate any signs of pulmonary hypertension. We discussed the reveals study and advised him to discuss with cardiology if he wishes to pursue a right heart catheterization. Repeat pulmonary function test now along with 6-minute walk. Patient states he has exertional hypoxemia. Currently 97 percent.   If he has exertional hypoxemia, consider bubble echocardiogram.

## 2022-05-06 DIAGNOSIS — I10 PRIMARY HYPERTENSION: ICD-10-CM

## 2022-05-06 DIAGNOSIS — R06.02 SHORTNESS OF BREATH: ICD-10-CM

## 2022-05-06 DIAGNOSIS — M45.7 ANKYLOSING SPONDYLITIS OF LUMBOSACRAL REGION (HCC): ICD-10-CM

## 2022-05-10 ENCOUNTER — TELEPHONE (OUTPATIENT)
Dept: CARDIOLOGY CLINIC | Age: 52
End: 2022-05-10

## 2022-05-10 NOTE — TELEPHONE ENCOUNTER
Please call patient to let him know that Dr. Portia Lino received final report for the 24 hour ambulatory BP monitoring: per Dr. Portia Lino, BP looks okay, instructions for patient to work on low salt diet daily, slowly increase walking program and keep routine f/u on 8/26/22 with Dr. Portia Lino. He would like pulmonary workup completed per Dr. Mandie Funez as planned.

## 2022-05-11 LAB
REASON FOR REJECTION: NORMAL
REJECTED TEST: NORMAL

## 2022-05-13 ENCOUNTER — OFFICE VISIT (OUTPATIENT)
Dept: SURGERY | Age: 52
End: 2022-05-13
Payer: COMMERCIAL

## 2022-05-13 VITALS — BODY MASS INDEX: 34.02 KG/M2 | DIASTOLIC BLOOD PRESSURE: 84 MMHG | SYSTOLIC BLOOD PRESSURE: 140 MMHG | WEIGHT: 265 LBS

## 2022-05-13 DIAGNOSIS — R10.11 RUQ PAIN: Primary | ICD-10-CM

## 2022-05-13 PROCEDURE — 99203 OFFICE O/P NEW LOW 30 MIN: CPT | Performed by: SURGERY

## 2022-05-13 ASSESSMENT — ENCOUNTER SYMPTOMS
EYES NEGATIVE: 1
SHORTNESS OF BREATH: 1
APNEA: 1
ABDOMINAL PAIN: 1

## 2022-05-13 NOTE — LETTER
[address]  [address] [address]  Phone: [phone]  Fax: [phone]    May 24, 2022    Patient: Verenice Hernandez  MRN:  [id number]  YOB: 1970  Date of Visit: 5/13/2022    Dear [facility],    Thank you for the request for consultation for Anahi Topete. Below are the relevant portions of my assessment and plan of care. Assessment:  61-year-old male who presents for evaluation of a 2-year history of shortness of breath. The shortness of breath is constant. He is currently undergoing evaluation per a pulmonologist in [address] [place] [address]. He does have some right upper quadrant abdominal pain associated with nausea and vomiting. He has been seen by Dr. Catracho Rodriguez and has undergone upper endoscopy x2 and colonoscopy. He has tried different diets which have offered no improvement in symptoms. CAT scan of the abdomen and pelvis from 4/7/2021 showed no abnormalities. HIDA scan from 11/9/2020 showed a normal ejection fraction of 93%. Plan: We will check a right upper quadrant ultrasound. I also recommend that the patient makes a follow-up appointment with gastroenterology. If you have questions, please do not hesitate to call me. I look forward to following Leena Ho along with you. Sincerely,    Aron Wolfe MD    CC providers:    Evelio Goel MD  [address] [facility] [address]  [address] [facility], APRN - [facility]  [address] [facility] [address]  [address] Tank Portillo MD  [address] [facility].  [id number] [facility] [address]  Via Fax: [phone]

## 2022-05-13 NOTE — PROGRESS NOTES
Substance and Sexual Activity    Alcohol use: Not Currently     Alcohol/week: 0.0 standard drinks     Comment:      Drug use: Never    Sexual activity: Not Currently   Other Topics Concern    Not on file   Social History Narrative    Not on file     Social Determinants of Health     Financial Resource Strain: Low Risk     Difficulty of Paying Living Expenses: Not hard at all   Food Insecurity: No Food Insecurity    Worried About [address] in the Last Year: Never true    [address] in the Last Year: Never true   Transportation Needs:     Lack of Transportation (Medical): Not on file    Lack of Transportation (Non-Medical): Not on file   Physical Activity:     Days of Exercise per Week: Not on file    Minutes of Exercise per Session: Not on file   Stress:     Feeling of Stress : Not on file   Social Connections:     Frequency of Communication with Friends and Family: Not on file    Frequency of Social Gatherings with Friends and Family: Not on file    Attends Gnosticism Services: Not on file    Active Member of [address] [place] [address] or Organizations: Not on file    Attends Club or Organization Meetings: Not on file    Marital Status: Not on file   Intimate Partner Violence:     Fear of Current or Ex-Partner: Not on file    Emotionally Abused: Not on file    Physically Abused: Not on file    Sexually Abused: Not on file   Housing Stability:     Unable to Pay for Housing in the Last Year: Not on file    Number of [place] in the Last Year: Not on file    Unstable Housing in the Last Year: Not on file       Review of Systems   Constitutional: Positive for activity change and fatigue. HENT: Negative. Eyes: Negative. Respiratory: Positive for apnea and shortness of breath. Cardiovascular: Negative. Gastrointestinal: Positive for abdominal pain. Endocrine: Negative. Genitourinary: Negative. Musculoskeletal: Negative. Skin: Negative.     Allergic/Immunologic: Positive for environmental allergies. Neurological: Positive for dizziness and headaches. Hematological: Negative. Psychiatric/Behavioral: Negative.        :   Physical Exam  Constitutional:       Appearance: He is well-developed. HENT:      Head: Normocephalic and atraumatic. Right Ear: External ear normal.      Left Ear: External ear normal.   Eyes:      Conjunctiva/sclera: Conjunctivae normal.   Cardiovascular:      Rate and Rhythm: Normal rate and regular rhythm. Pulmonary:      Effort: Pulmonary effort is normal.      Breath sounds: Normal breath sounds. Abdominal:      General: There is no distension. Palpations: Abdomen is soft. Tenderness: There is no abdominal tenderness. Musculoskeletal:         General: Normal range of motion. Cervical back: Normal range of motion and neck supple. Skin:     General: Skin is warm and dry. Neurological:      Mental Status: He is alert and oriented to person, place, and time. Psychiatric:         Behavior: Behavior normal.       Wt 265 lb (120.2 kg)   BMI 34.02 kg/m²     :      27-year-old male who presents for evaluation of a 2-year history of shortness of breath. The shortness of breath is constant. He is currently undergoing evaluation per a pulmonologist in [address] [place] [address]. He does have some right upper quadrant abdominal pain associated with nausea and vomiting. He has been seen by Dr. Ita Watson and has undergone upper endoscopy x2 and colonoscopy. He has tried different diets which have offered no improvement in symptoms. CAT scan of the abdomen and pelvis from 4/7/2021 showed no abnormalities. HIDA scan from 11/9/2020 showed a normal ejection fraction of 93%. Plan: We will check a right upper quadrant ultrasound. I also recommend that the patient makes a follow-up appointment with gastroenterology.

## 2022-05-19 ENCOUNTER — TELEPHONE (OUTPATIENT)
Dept: PULMONOLOGY | Age: 52
End: 2022-05-19

## 2022-05-19 NOTE — TELEPHONE ENCOUNTER
Gabi from central scheduling called to verify orders that were put in on this patient on 5-5-2022.   1. Bronchial Challenge   2. Full PFT Study with Brochodilator  3. 6 MWT  Gabi states the Bronchial challenge looked like it was closed out? She wanted to know if Dr. Regina Zamarripa still wants him to have all of these tests done, or just some. *She asks for a call back from staff once we have an answer. #[phone] is her direct line.    Message routed to Dr. Regina Zamarripa

## 2022-05-20 ENCOUNTER — HOSPITAL ENCOUNTER (OUTPATIENT)
Dept: ULTRASOUND IMAGING | Age: 52
Discharge: HOME | End: 2022-05-20
Payer: COMMERCIAL

## 2022-05-20 DIAGNOSIS — R10.11 RUQ PAIN: ICD-10-CM

## 2022-05-20 PROCEDURE — 76705 ECHO EXAM OF ABDOMEN: CPT

## 2022-05-24 LAB
Lab: NORMAL
REPORT: NORMAL
THIS TEST SENT TO: NORMAL

## 2022-06-03 ENCOUNTER — TELEPHONE (OUTPATIENT)
Dept: CARDIOLOGY CLINIC | Age: 52
End: 2022-06-03

## 2022-06-03 ENCOUNTER — ANESTHESIA EVENT (OUTPATIENT)
Dept: OPERATING ROOM | Age: 52
End: 2022-06-03
Payer: COMMERCIAL

## 2022-06-03 ENCOUNTER — TELEPHONE (OUTPATIENT)
Dept: PULMONOLOGY | Age: 52
End: 2022-06-03

## 2022-06-03 RX ORDER — ASPIRIN 325 MG
325 TABLET ORAL DAILY
COMMUNITY
End: 2022-06-30

## 2022-06-03 NOTE — PROGRESS NOTES
Name_______________________________________Printed:____________________  Date and time of surgery___6/13/22 0945 main_____________________Arrival Time:______0815__________   1. The instructions given regarding when and if a patient needs to stop oral intake prior to surgery varies. Follow the specific instructions you were given                  __x_Nothing to eat or to drink after Midnight the night before.                   ____Carbo loading or ERAS instructions will be given to select patients-if you have been given those instructions -please do the following                           The evening before your surgery after dinner before midnight drink 40 ounces of gatorade. If you are diabetic use sugar free. The morning of surgery drink 40 ounces of water. This needs to be finished 3 hours prior to your surgery start time. 2. Take the following pills with a small sip of water on the morning of surgery___________________________________________________                  Do not take blood pressure medications ending in pril or sartan the shelia prior to surgery or the morning of surgery_   3. Aspirin, Ibuprofen, Advil, Naproxen, Vitamin E and other Anti-inflammatory products and supplements should be stopped for 5 -7days before surgery or as directed by your physician. 4. Check with your Doctor regarding stopping Plavix, Coumadin,Eliquis, Lovenox,Effient,Pradaxa,Xarelto, Fragmin or other blood thinners and follow their instructions. 5. Do not smoke, and do not drink any alcoholic beverages 24 hours prior to surgery. This includes NA Beer. Refrain from the usage of any recreational drugs. 6. You may brush your teeth and gargle the morning of surgery. DO NOT SWALLOW WATER   7. You MUST make arrangements for a responsible adult to stay on site while you are here and take you home after your surgery. You will not be allowed to leave alone or drive yourself home.   It is strongly suggested someone stay with you the first 24 hrs. Your surgery will be cancelled if you do not have a ride home. 8. A parent/legal guardian must accompany a child scheduled for surgery and plan to stay at the hospital until the child is discharged. Please do not bring other children with you. 9. Please wear simple, loose fitting clothing to the hospital.  Viviana Fajardo not bring valuables (money, credit cards, checkbooks, etc.) Do not wear any makeup (including no eye makeup) or nail polish on your fingers or toes. 10. DO NOT wear any jewelry or piercings on day of surgery. All body piercing jewelry must be removed. 11. If you have _x__dentures, they will be removed before going to the OR; we will provide you a container. If you wear _x__contact lenses or _x__glasses, they will be removed; please bring a case for them. 12. Please see your family doctor/pediatrician for a history & physical and/or concerning medications. Bring any test results/reports from your physician's office. PCP__________________Phone___________H&P Appt. Date________             13 If you  have a Living Will and Durable Power of  for Healthcare, please bring in a copy. 15. Notify your Surgeon if you develop any illness between now and surgery  time, cough, cold, fever, sore throat, nausea, vomiting, etc.  Please notify your surgeon if you experience dizziness, shortness of breath or blurred vision between now & the time of your surgery             15. DO NOT shave your operative site 96 hours prior to surgery. For face & neck surgery, men may use an electric razor 48 hours prior to surgery. 16. Shower the night before or morning of surgery using an antibacterial soap or as you have been instructed. 17. To provide excellent care visitors will be limited to one in the room at any given time. 18.  Please bring picture ID and insurance card.              19.  Visit our web site for additional information:  [email or web address]              20.During flu season no children under the age of 15 are permitted in the hospital for the safety of all patients. 21. If you take a long acting insulin in the evening only  take half of your usual  dose the night  before your procedure              22. If you use a c-pap please bring DOS if staying overnight,             23.For your convenience [facility] has a pharmacy on site to fill your prescriptions. 24. If you use oxygen and have a portable tank please bring it  with you the DOS             25. Bring a complete list of all your medications with name and dose include any supplements. 26. Other__________________________________________   *Please call pre admission testing if you any further questions   Manju Cornejo         [facility]   [address]    [place] [address]. Air  [phone]   [address] [place] [address]       VISITOR POLICY(subject to change)    Current policy is 2 visitors per patient. No children. A mask is required. Visiting hours are 8a-8p. Overnight visitors will be at the discretion of the nurse. All above information reviewed with patient in person or by phone. Patient verbalizes understanding. All questions and concerns addressed.                                                                                                  Patient/Rep________pt____________                                                                                                                                    PRE OP INSTRUCTIONS

## 2022-06-03 NOTE — PROGRESS NOTES
Spoke with Dr. Leonia Osgood concerning patient PAT needs. Dr. Leonia Osgood requests that patient have cardiac clearance, pulmonary clearance with the completed tests prior to surgery. Pt. Has been seen per Dr. Armando Greenberg (cardiology - [phone]) who has see pt. For SOB over past several months. Pt. Has been worked up with EKG (psuedo infarct), Stress Echo normal LV trivial tricuspid regurgitation. He was taking HCTZ and was taken off the meds. Also being seen by Dr. Seble Louie (Pulmonology - [phone]). Pt still needs to finish pulmonolgy workup (PFT and cxry). Called and spoke with Francesco Reeves at Dr. Tom Moran office ([phone]) concerning above and asked her to speak with Dr. Paula Tomlinson and have patient complete the above. Also shared that cardiac and pulmonolgy clearance needed as well as work up of PFT and cxry.

## 2022-06-03 NOTE — TELEPHONE ENCOUNTER
Dr Lee Slade,   Please advise below preop cardio risk assessment in Dr. Lorraine Souza absence. Will need to hold  mg daily for 3-5 days prior. Last f/u with Dr. Encarnacion Nail 4/29/22 and return for 3-4 month f/u. Thanks. Detail Level: Zone

## 2022-06-03 NOTE — TELEPHONE ENCOUNTER
Cardiac Risk Assessment message information:     What type of procedure are you having: CHOLECYSTECTOMY W/ CHOLANGIOGRAM     When is your procedure scheduled for: 6/13/22 VIMAL ADORNO      Who is the physician performing your procedure: DR Hardy Betancourt    Which medications need to be held for your procedure and for how long: ASPIRIN 325 for 3 to 5 days prior          Phone Number: [phone]     Fax number to send the letter: [phone]

## 2022-06-03 NOTE — TELEPHONE ENCOUNTER
Patient was seen for shortness of breath but all test have been normal with normal oxygen saturation.       Therefore, he is low risk for surgery

## 2022-06-07 NOTE — TELEPHONE ENCOUNTER
Patient may proceed with cholecystectomy  She has undergone a cardiac nuclear scan in January of this year which showed no ischemia

## 2022-06-09 ENCOUNTER — TELEPHONE (OUTPATIENT)
Dept: RHEUMATOLOGY | Age: 52
End: 2022-06-09

## 2022-06-09 NOTE — TELEPHONE ENCOUNTER
Spoke with the patient recommended that he start Humira 2 weeks after gallbladder surgery as long as there is no complication.

## 2022-06-09 NOTE — TELEPHONE ENCOUNTER
Pt calling to give Dr. Eber Artis update. Pt is having GB removed on Monday, by Dr. Laurita Stock. Pt has not yet started the Humira. Pt asking for input on when he should begin Humira - or how long can he wait?

## 2022-06-10 NOTE — PROGRESS NOTES
Cardiology sent letter for clearnace and pulmonary left note for low risk. Neither PFT nor cxry completed. Spoke with Dr. Shelby Swain cleared for surgery on Monday.

## 2022-06-13 ENCOUNTER — HOSPITAL ENCOUNTER (OUTPATIENT)
Age: 52
Setting detail: OUTPATIENT SURGERY
Discharge: HOME | End: 2022-06-13
Attending: SURGERY | Admitting: SURGERY
Payer: COMMERCIAL

## 2022-06-13 ENCOUNTER — APPOINTMENT (OUTPATIENT)
Dept: GENERAL RADIOLOGY | Age: 52
End: 2022-06-13
Attending: SURGERY
Payer: COMMERCIAL

## 2022-06-13 ENCOUNTER — ANESTHESIA (OUTPATIENT)
Dept: OPERATING ROOM | Age: 52
End: 2022-06-13
Payer: COMMERCIAL

## 2022-06-13 VITALS
HEIGHT: 74 IN | TEMPERATURE: 96.4 F | BODY MASS INDEX: 32.34 KG/M2 | SYSTOLIC BLOOD PRESSURE: 130 MMHG | RESPIRATION RATE: 16 BRPM | DIASTOLIC BLOOD PRESSURE: 72 MMHG | OXYGEN SATURATION: 98 % | WEIGHT: 252 LBS | HEART RATE: 79 BPM

## 2022-06-13 DIAGNOSIS — K80.20 CALCULUS OF GALLBLADDER WITHOUT CHOLECYSTITIS WITHOUT OBSTRUCTION: ICD-10-CM

## 2022-06-13 PROCEDURE — 3700000000 HC ANESTHESIA ATTENDED CARE: Performed by: SURGERY

## 2022-06-13 PROCEDURE — 6370000000 HC RX 637 (ALT 250 FOR IP): Performed by: ANESTHESIOLOGY

## 2022-06-13 PROCEDURE — 7100000010 HC PHASE II RECOVERY - FIRST 15 MIN: Performed by: SURGERY

## 2022-06-13 PROCEDURE — 3600000004 HC SURGERY LEVEL 4 BASE: Performed by: SURGERY

## 2022-06-13 PROCEDURE — 3700000001 HC ADD 15 MINUTES (ANESTHESIA): Performed by: SURGERY

## 2022-06-13 PROCEDURE — 3600000014 HC SURGERY LEVEL 4 ADDTL 15MIN: Performed by: SURGERY

## 2022-06-13 PROCEDURE — 2580000003 HC RX 258: Performed by: ANESTHESIOLOGY

## 2022-06-13 PROCEDURE — 88304 TISSUE EXAM BY PATHOLOGIST: CPT

## 2022-06-13 PROCEDURE — 7100000000 HC PACU RECOVERY - FIRST 15 MIN: Performed by: SURGERY

## 2022-06-13 PROCEDURE — 6360000002 HC RX W HCPCS: Performed by: ANESTHESIOLOGY

## 2022-06-13 PROCEDURE — C1894 INTRO/SHEATH, NON-LASER: HCPCS | Performed by: SURGERY

## 2022-06-13 PROCEDURE — 6360000002 HC RX W HCPCS

## 2022-06-13 PROCEDURE — 47563 LAPARO CHOLECYSTECTOMY/GRAPH: CPT | Performed by: SURGERY

## 2022-06-13 PROCEDURE — 2500000003 HC RX 250 WO HCPCS: Performed by: SURGERY

## 2022-06-13 PROCEDURE — 6370000000 HC RX 637 (ALT 250 FOR IP)

## 2022-06-13 PROCEDURE — 2720000010 HC SURG SUPPLY STERILE: Performed by: SURGERY

## 2022-06-13 PROCEDURE — 7100000011 HC PHASE II RECOVERY - ADDTL 15 MIN: Performed by: SURGERY

## 2022-06-13 PROCEDURE — 6360000002 HC RX W HCPCS: Performed by: SURGERY

## 2022-06-13 PROCEDURE — 74300 X-RAY BILE DUCTS/PANCREAS: CPT

## 2022-06-13 PROCEDURE — 7100000001 HC PACU RECOVERY - ADDTL 15 MIN: Performed by: SURGERY

## 2022-06-13 PROCEDURE — 2580000003 HC RX 258: Performed by: SURGERY

## 2022-06-13 PROCEDURE — 6360000004 HC RX CONTRAST MEDICATION: Performed by: SURGERY

## 2022-06-13 PROCEDURE — 2709999900 HC NON-CHARGEABLE SUPPLY: Performed by: SURGERY

## 2022-06-13 PROCEDURE — 2500000003 HC RX 250 WO HCPCS: Performed by: ANESTHESIOLOGY

## 2022-06-13 RX ORDER — BUPIVACAINE HYDROCHLORIDE 2.5 MG/ML
INJECTION, SOLUTION EPIDURAL; INFILTRATION; INTRACAUDAL
Status: COMPLETED | OUTPATIENT
Start: 2022-06-13 | End: 2022-06-13

## 2022-06-13 RX ORDER — ONDANSETRON 2 MG/ML
4 INJECTION INTRAMUSCULAR; INTRAVENOUS
Status: DISCONTINUED | OUTPATIENT
Start: 2022-06-13 | End: 2022-06-13 | Stop reason: HOSPADM

## 2022-06-13 RX ORDER — IPRATROPIUM BROMIDE AND ALBUTEROL SULFATE 2.5; .5 MG/3ML; MG/3ML
1 SOLUTION RESPIRATORY (INHALATION)
Status: DISCONTINUED | OUTPATIENT
Start: 2022-06-13 | End: 2022-06-13 | Stop reason: HOSPADM

## 2022-06-13 RX ORDER — PROPOFOL 10 MG/ML
INJECTION, EMULSION INTRAVENOUS PRN
Status: DISCONTINUED | OUTPATIENT
Start: 2022-06-13 | End: 2022-06-13 | Stop reason: SDUPTHER

## 2022-06-13 RX ORDER — IPRATROPIUM BROMIDE AND ALBUTEROL SULFATE 2.5; .5 MG/3ML; MG/3ML
SOLUTION RESPIRATORY (INHALATION)
Status: COMPLETED
Start: 2022-06-13 | End: 2022-06-13

## 2022-06-13 RX ORDER — OXYCODONE HYDROCHLORIDE 5 MG/1
5 TABLET ORAL
Status: COMPLETED | OUTPATIENT
Start: 2022-06-13 | End: 2022-06-13

## 2022-06-13 RX ORDER — FENTANYL CITRATE 50 UG/ML
INJECTION, SOLUTION INTRAMUSCULAR; INTRAVENOUS PRN
Status: DISCONTINUED | OUTPATIENT
Start: 2022-06-13 | End: 2022-06-13 | Stop reason: SDUPTHER

## 2022-06-13 RX ORDER — ROCURONIUM BROMIDE 10 MG/ML
INJECTION, SOLUTION INTRAVENOUS PRN
Status: DISCONTINUED | OUTPATIENT
Start: 2022-06-13 | End: 2022-06-13 | Stop reason: SDUPTHER

## 2022-06-13 RX ORDER — ONDANSETRON 2 MG/ML
INJECTION INTRAMUSCULAR; INTRAVENOUS PRN
Status: DISCONTINUED | OUTPATIENT
Start: 2022-06-13 | End: 2022-06-13 | Stop reason: SDUPTHER

## 2022-06-13 RX ORDER — ALBUTEROL SULFATE 2.5 MG/3ML
2.5 SOLUTION RESPIRATORY (INHALATION) ONCE
Status: DISCONTINUED | OUTPATIENT
Start: 2022-06-13 | End: 2022-06-13 | Stop reason: HOSPADM

## 2022-06-13 RX ORDER — KETOROLAC TROMETHAMINE 30 MG/ML
INJECTION, SOLUTION INTRAMUSCULAR; INTRAVENOUS PRN
Status: DISCONTINUED | OUTPATIENT
Start: 2022-06-13 | End: 2022-06-13 | Stop reason: SDUPTHER

## 2022-06-13 RX ORDER — HYDROMORPHONE HCL 110MG/55ML
0.5 PATIENT CONTROLLED ANALGESIA SYRINGE INTRAVENOUS EVERY 5 MIN PRN
Status: DISCONTINUED | OUTPATIENT
Start: 2022-06-13 | End: 2022-06-13 | Stop reason: HOSPADM

## 2022-06-13 RX ORDER — SODIUM CHLORIDE, SODIUM LACTATE, POTASSIUM CHLORIDE, CALCIUM CHLORIDE 600; 310; 30; 20 MG/100ML; MG/100ML; MG/100ML; MG/100ML
INJECTION, SOLUTION INTRAVENOUS CONTINUOUS PRN
Status: COMPLETED | OUTPATIENT
Start: 2022-06-13 | End: 2022-06-13

## 2022-06-13 RX ORDER — ALBUTEROL SULFATE 2.5 MG/3ML
SOLUTION RESPIRATORY (INHALATION)
Status: COMPLETED
Start: 2022-06-13 | End: 2022-06-13

## 2022-06-13 RX ORDER — SODIUM CHLORIDE 9 MG/ML
INJECTION, SOLUTION INTRAVENOUS CONTINUOUS
Status: DISCONTINUED | OUTPATIENT
Start: 2022-06-13 | End: 2022-06-13 | Stop reason: HOSPADM

## 2022-06-13 RX ORDER — ALBUTEROL SULFATE 2.5 MG/3ML
2.5 SOLUTION RESPIRATORY (INHALATION) ONCE
Status: COMPLETED | OUTPATIENT
Start: 2022-06-13 | End: 2022-06-13

## 2022-06-13 RX ORDER — ALBUTEROL SULFATE 90 UG/1
AEROSOL, METERED RESPIRATORY (INHALATION) PRN
Status: DISCONTINUED | OUTPATIENT
Start: 2022-06-13 | End: 2022-06-13 | Stop reason: SDUPTHER

## 2022-06-13 RX ORDER — LABETALOL HYDROCHLORIDE 5 MG/ML
5 INJECTION, SOLUTION INTRAVENOUS
Status: DISCONTINUED | OUTPATIENT
Start: 2022-06-13 | End: 2022-06-13 | Stop reason: HOSPADM

## 2022-06-13 RX ORDER — HYDROCODONE BITARTRATE AND ACETAMINOPHEN 5; 325 MG/1; MG/1
1-2 TABLET ORAL EVERY 4 HOURS PRN
Qty: 15 TABLET | Refills: 0 | Status: SHIPPED | OUTPATIENT
Start: 2022-06-13 | End: 2022-06-16

## 2022-06-13 RX ORDER — DEXAMETHASONE SODIUM PHOSPHATE 4 MG/ML
INJECTION, SOLUTION INTRA-ARTICULAR; INTRALESIONAL; INTRAMUSCULAR; INTRAVENOUS; SOFT TISSUE PRN
Status: DISCONTINUED | OUTPATIENT
Start: 2022-06-13 | End: 2022-06-13 | Stop reason: SDUPTHER

## 2022-06-13 RX ORDER — MIDAZOLAM HYDROCHLORIDE 1 MG/ML
INJECTION INTRAMUSCULAR; INTRAVENOUS PRN
Status: DISCONTINUED | OUTPATIENT
Start: 2022-06-13 | End: 2022-06-13 | Stop reason: SDUPTHER

## 2022-06-13 RX ORDER — PHENYLEPHRINE HCL IN 0.9% NACL 1 MG/10 ML
SYRINGE (ML) INTRAVENOUS PRN
Status: DISCONTINUED | OUTPATIENT
Start: 2022-06-13 | End: 2022-06-13 | Stop reason: SDUPTHER

## 2022-06-13 RX ORDER — HYDROMORPHONE HCL 110MG/55ML
0.25 PATIENT CONTROLLED ANALGESIA SYRINGE INTRAVENOUS EVERY 5 MIN PRN
Status: DISCONTINUED | OUTPATIENT
Start: 2022-06-13 | End: 2022-06-13 | Stop reason: HOSPADM

## 2022-06-13 RX ORDER — LIDOCAINE HYDROCHLORIDE 20 MG/ML
INJECTION, SOLUTION EPIDURAL; INFILTRATION; INTRACAUDAL; PERINEURAL PRN
Status: DISCONTINUED | OUTPATIENT
Start: 2022-06-13 | End: 2022-06-13 | Stop reason: SDUPTHER

## 2022-06-13 RX ORDER — LIDOCAINE HYDROCHLORIDE 10 MG/ML
1 INJECTION, SOLUTION EPIDURAL; INFILTRATION; INTRACAUDAL; PERINEURAL
Status: DISCONTINUED | OUTPATIENT
Start: 2022-06-13 | End: 2022-06-13 | Stop reason: HOSPADM

## 2022-06-13 RX ORDER — SUCCINYLCHOLINE/SOD CL,ISO/PF 200MG/10ML
SYRINGE (ML) INTRAVENOUS PRN
Status: DISCONTINUED | OUTPATIENT
Start: 2022-06-13 | End: 2022-06-13 | Stop reason: SDUPTHER

## 2022-06-13 RX ADMIN — ROCURONIUM BROMIDE 5 MG: 10 INJECTION, SOLUTION INTRAVENOUS at 10:21

## 2022-06-13 RX ADMIN — PROPOFOL 220 MG: 10 INJECTION, EMULSION INTRAVENOUS at 09:42

## 2022-06-13 RX ADMIN — Medication 100 MCG: at 10:12

## 2022-06-13 RX ADMIN — Medication 100 MCG: at 10:09

## 2022-06-13 RX ADMIN — MIDAZOLAM 2 MG: 1 INJECTION INTRAMUSCULAR; INTRAVENOUS at 09:35

## 2022-06-13 RX ADMIN — SODIUM CHLORIDE: 9 INJECTION, SOLUTION INTRAVENOUS at 08:58

## 2022-06-13 RX ADMIN — KETOROLAC TROMETHAMINE 30 MG: 30 INJECTION, SOLUTION INTRAMUSCULAR; INTRAVENOUS at 10:34

## 2022-06-13 RX ADMIN — ALBUTEROL SULFATE 8 PUFF: 90 AEROSOL, METERED RESPIRATORY (INHALATION) at 10:22

## 2022-06-13 RX ADMIN — FENTANYL CITRATE 50 MCG: 50 INJECTION, SOLUTION INTRAMUSCULAR; INTRAVENOUS at 10:37

## 2022-06-13 RX ADMIN — ALBUTEROL SULFATE 2.5 MG: 2.5 SOLUTION RESPIRATORY (INHALATION) at 11:27

## 2022-06-13 RX ADMIN — OXYCODONE 5 MG: 5 TABLET ORAL at 12:33

## 2022-06-13 RX ADMIN — Medication 100 MCG: at 10:15

## 2022-06-13 RX ADMIN — LIDOCAINE HYDROCHLORIDE 100 MG: 20 INJECTION, SOLUTION EPIDURAL; INFILTRATION; INTRACAUDAL; PERINEURAL at 09:42

## 2022-06-13 RX ADMIN — Medication 160 MG: at 09:42

## 2022-06-13 RX ADMIN — DEXAMETHASONE SODIUM PHOSPHATE 4 MG: 4 INJECTION, SOLUTION INTRAMUSCULAR; INTRAVENOUS at 09:53

## 2022-06-13 RX ADMIN — ROCURONIUM BROMIDE 30 MG: 10 INJECTION, SOLUTION INTRAVENOUS at 09:53

## 2022-06-13 RX ADMIN — FENTANYL CITRATE 50 MCG: 50 INJECTION, SOLUTION INTRAMUSCULAR; INTRAVENOUS at 10:45

## 2022-06-13 RX ADMIN — FENTANYL CITRATE 50 MCG: 50 INJECTION, SOLUTION INTRAMUSCULAR; INTRAVENOUS at 09:41

## 2022-06-13 RX ADMIN — ROCURONIUM BROMIDE 5 MG: 10 INJECTION, SOLUTION INTRAVENOUS at 09:42

## 2022-06-13 RX ADMIN — IPRATROPIUM BROMIDE AND ALBUTEROL SULFATE 1 AMPULE: .5; 3 SOLUTION RESPIRATORY (INHALATION) at 11:02

## 2022-06-13 RX ADMIN — SODIUM CHLORIDE: 9 INJECTION, SOLUTION INTRAVENOUS at 10:34

## 2022-06-13 RX ADMIN — SUGAMMADEX 200 MG: 100 INJECTION, SOLUTION INTRAVENOUS at 10:34

## 2022-06-13 RX ADMIN — SUGAMMADEX 100 MG: 100 INJECTION, SOLUTION INTRAVENOUS at 10:45

## 2022-06-13 RX ADMIN — IPRATROPIUM BROMIDE AND ALBUTEROL SULFATE 1 AMPULE: 2.5; .5 SOLUTION RESPIRATORY (INHALATION) at 11:02

## 2022-06-13 RX ADMIN — CEFAZOLIN 2000 MG: 2 INJECTION, POWDER, FOR SOLUTION INTRAMUSCULAR; INTRAVENOUS at 09:32

## 2022-06-13 RX ADMIN — FENTANYL CITRATE 50 MCG: 50 INJECTION, SOLUTION INTRAMUSCULAR; INTRAVENOUS at 10:02

## 2022-06-13 RX ADMIN — ROCURONIUM BROMIDE 10 MG: 10 INJECTION, SOLUTION INTRAVENOUS at 10:28

## 2022-06-13 RX ADMIN — ONDANSETRON 4 MG: 2 INJECTION INTRAMUSCULAR; INTRAVENOUS at 10:20

## 2022-06-13 ASSESSMENT — PAIN DESCRIPTION - LOCATION
LOCATION: ABDOMEN

## 2022-06-13 ASSESSMENT — PAIN DESCRIPTION - ORIENTATION
ORIENTATION: UPPER
ORIENTATION: MID
ORIENTATION: UPPER

## 2022-06-13 ASSESSMENT — PAIN SCALES - GENERAL
PAINLEVEL_OUTOF10: 6
PAINLEVEL_OUTOF10: 6
PAINLEVEL_OUTOF10: 8
PAINLEVEL_OUTOF10: 4

## 2022-06-13 ASSESSMENT — PAIN DESCRIPTION - ONSET
ONSET: ON-GOING
ONSET: ON-GOING
ONSET: GRADUAL

## 2022-06-13 ASSESSMENT — PAIN DESCRIPTION - FREQUENCY
FREQUENCY: CONTINUOUS

## 2022-06-13 ASSESSMENT — PAIN DESCRIPTION - DESCRIPTORS
DESCRIPTORS: ACHING
DESCRIPTORS: ACHING;SORE
DESCRIPTORS: ACHING;SORE

## 2022-06-13 ASSESSMENT — PAIN - FUNCTIONAL ASSESSMENT
PAIN_FUNCTIONAL_ASSESSMENT: PREVENTS OR INTERFERES SOME ACTIVE ACTIVITIES AND ADLS
PAIN_FUNCTIONAL_ASSESSMENT: ACTIVITIES ARE NOT PREVENTED
PAIN_FUNCTIONAL_ASSESSMENT: NONE - DENIES PAIN

## 2022-06-13 ASSESSMENT — ENCOUNTER SYMPTOMS: SHORTNESS OF BREATH: 1

## 2022-06-13 ASSESSMENT — PAIN DESCRIPTION - PAIN TYPE
TYPE: SURGICAL PAIN

## 2022-06-13 NOTE — PROGRESS NOTES
Pt having continued expiratory wheezes bilateral, dr Nadeen Moran called and notified. At bedside to assess pt.

## 2022-06-13 NOTE — PROGRESS NOTES
Pt coughing and having expiratory wheezing in bilateral lungs. Duoneb HHN initiated. Pt awake and alert, sitting up in bed. Pt does not appear to be in distress. Respirations even unlabored.

## 2022-06-13 NOTE — PROGRESS NOTES
Pt in phase 2 of care. Pt is alert and oriented. Abdominal lap sites remain CDI. Pt complaining of 6/10 pain. Waiting on PO intake to increase prior to giving PO pain medication.

## 2022-06-13 NOTE — PROGRESS NOTES
CLINICAL PHARMACY NOTE: MEDS TO BEDS    Total # of Prescriptions Filled: 1   The following medications were delivered to the patient:  · Norco 5-325 mg    Additional Documentation:    Delivered to Patient=Signed  Rich Burroughs CPhT

## 2022-06-13 NOTE — PROGRESS NOTES
Albuterol HHN complete, pt having continued expiratory bilateral wheezing. Pt does not appear to be in distress, respirations even and unlabored, oxygen 100% on room air.

## 2022-06-13 NOTE — BRIEF OP NOTE
Brief Postoperative Note      Patient: Beth Pemberton  YOB: 1970  MRN: [id number]    Date of Procedure: 6/13/2022    Pre-Op Diagnosis: Calculus of gallbladder without cholecystitis without obstruction [K80.20]    Post-Op Diagnosis: Same       Procedure(s):  LAPAROSCOPIC CHOLECYSTECTOMY WITH CHOLANGIOGRAM    Surgeon(s):  Josiah Wynn MD    Assistant:  Surgical Assistant: Lexy Calderon    Anesthesia: General    Estimated Blood Loss (mL): Minimal    Complications: None    Specimens:   ID Type Source Tests Collected by Time Destination   A : A. GALLBLADDER Tissue Gallbladder SURGICAL PATHOLOGY Josiah Wynn MD 6/13/2022 1028        Implants:  * No implants in log *      Drains: * No LDAs found *    Findings: normal cholangiogram      Electronically signed by Josiah Wynn MD on 6/13/2022 at 10:33 AM

## 2022-06-13 NOTE — ANESTHESIA POSTPROCEDURE EVALUATION
Department of Anesthesiology  Postprocedure Note    Patient: Shania Varela  MRN: [id number]  YOB: 1970  Date of evaluation: 6/13/2022  Time:  10:54 AM     Procedure Summary     Date: 06/13/22 Room / Location: [address]    Anesthesia Start: 0935 Anesthesia Stop: 1054    Procedure: LAPAROSCOPIC CHOLECYSTECTOMY WITH CHOLANGIOGRAM (N/A Abdomen) Diagnosis:       Calculus of gallbladder without cholecystitis without obstruction      (Calculus of gallbladder without cholecystitis without obstruction [K80.20])    Surgeons: Ulysses Stade, MD Responsible Provider: Ahn Carlton MD    Anesthesia Type: general ASA Status: 3          Anesthesia Type: No value filed. Ana Laura Phase I: Ana Laura Score: 8    Ana Laura Phase II:      Last vitals: Reviewed and per EMR flowsheets.        Anesthesia Post Evaluation    Patient location during evaluation: PACU  Patient participation: complete - patient participated  Level of consciousness: awake and alert  Airway patency: patent  Nausea & Vomiting: no nausea and no vomiting  Complications: no  Cardiovascular status: hemodynamically stable  Respiratory status: acceptable  Hydration status: stable  Multimodal analgesia pain management approach

## 2022-06-13 NOTE — PROGRESS NOTES
Discharge instructions reviewed with patient and patient family at bedside. All questions answered. Instructed patient on continuous use of the IS. Pt verbalized understanding. Pt pain decreased from 6/10 pain to a 4/10. IV removed. Pt moving around and getting dressed. Will reassess.

## 2022-06-13 NOTE — PROGRESS NOTES
Pt arrived from OR, report from anesthesia MD and RN. Pt had laparoscopic cholecystectomy with cholangiogram. Abdomen soft non distended, surgical incisions x 4 closed with surgical glue, no drainage / bruising/ swelling noted at incision sites. Pt awakening and responsive to pain upon arrival. Respirations even unlabored, simple mask 6 liters in place. Rhofade Counseling: Rhofade is a topical medication which can decrease superficial blood flow where applied. Side effects are uncommon and include stinging, redness and allergic reactions.

## 2022-06-13 NOTE — ANESTHESIA PRE PROCEDURE
Department of Anesthesiology  Preprocedure Note       Name:  Keila Person   Age:  50 y.o.  :  1970                                          MRN:  [id number]         Date:  2022      Surgeon: Gus Oglesby):  Oumar Reid MD    Procedure: Procedure(s):  LAPAROSCOPIC CHOLECYSTECTOMY WITH CHOLANGIOGRAM    Medications prior to admission:   Prior to Admission medications    Medication Sig Start Date End Date Taking? Authorizing Provider   aspirin 325 mg tablet Take 325 mg by mouth daily   Yes Historical Provider, MD   etodolac (LODINE) 400 MG tablet Take 1 tablet by mouth 2 times daily 22   Mariah Vila MD   PHENYLEPHRINE HCL NA by Nasal route Several times a day per patient  Patient not taking: Reported on 6/3/2022    Historical Provider, MD   Cetirizine HCl (ZYRTEC PO) Take by mouth as needed  Patient not taking: Reported on 6/3/2022    Historical Provider, MD       Current medications:    No current facility-administered medications for this encounter. Allergies:     Allergies   Allergen Reactions    Pcn [Penicillins] Hives       Problem List:    Patient Active Problem List   Diagnosis Code    Ankylosing spondylitis ([facility] [address] M45.9    Hypersomnia G47.10    Shortness of breath R06.02    Alpha-1-antitrypsin deficiency carrier Z14.8    Rash R21    SOURAV (obstructive sleep apnea) G47.33    Dysfunction of eustachian tube H69.80    Anxiety F41.9    Primary hypertension I10    RUQ pain R10.11       Past Medical History:        Diagnosis Date    Ankylosing spondylitis (HCC)     Fatty liver     Hypertension     SOURAV on CPAP        Past Surgical History:        Procedure Laterality Date    HERNIA REPAIR Right        Social History:    Social History     Tobacco Use    Smoking status: Never Smoker    Smokeless tobacco: Never Used   Substance Use Topics    Alcohol use: Not Currently     Alcohol/week: 0.0 standard drinks     Comment: minimal                                Counseling given: Not Answered      Vital Signs (Current):   Vitals:    06/03/22 1156   Weight: 250 lb (113.4 kg)   Height: 6' 2\" (1.88 m)                                              BP Readings from Last 3 Encounters:   05/13/22 (!) 140/84   05/05/22 136/80   04/29/22 136/80       NPO Status:                                                                                 BMI:   Wt Readings from Last 3 Encounters:   06/03/22 250 lb (113.4 kg)   05/13/22 265 lb (120.2 kg)   05/05/22 268 lb (121.6 kg)     Body mass index is 32.1 kg/m². CBC:   Lab Results   Component Value Date    WBC 7.1 03/16/2021    RBC 5.68 03/16/2021    HGB 16.2 03/16/2021    HCT 48.6 03/16/2021    MCV 85.7 03/16/2021    RDW 13.5 03/16/2021     03/16/2021       CMP:   Lab Results   Component Value Date     04/14/2022    K 4.5 04/14/2022     04/14/2022    CO2 24 04/14/2022    BUN 14 04/14/2022    CREATININE 0.9 04/14/2022    GFRAA >60 04/14/2022    GFRAA >60 03/28/2013    AGRATIO 1.3 04/14/2022    LABGLOM >60 04/14/2022    GLUCOSE 103 04/14/2022    PROT 7.6 04/14/2022    PROT 7.8 01/31/2013    CALCIUM 9.8 04/14/2022    BILITOT 0.6 04/14/2022    ALKPHOS 99 04/14/2022    AST 24 04/14/2022    ALT 28 04/14/2022       POC Tests: No results for input(s): POCGLU, POCNA, POCK, POCCL, POCBUN, POCHEMO, POCHCT in the last 72 hours.     Coags: No results found for: PROTIME, INR, APTT    HCG (If Applicable): No results found for: PREGTESTUR, PREGSERUM, HCG, HCGQUANT     ABGs: No results found for: PHART, PO2ART, UKJ8FBB, ZSK0DUH, BEART, D8SHMNZF     Type & Screen (If Applicable):  No results found for: LABABO, LABRH    Drug/Infectious Status (If Applicable):  No results found for: HIV, HEPCAB    COVID-19 Screening (If Applicable):   Lab Results   Component Value Date    COVID19 Not Detected 11/30/2020           Anesthesia Evaluation  Patient summary reviewed and Nursing notes reviewed no history of anesthetic complications:   Airway: Mallampati: II  TM distance: >3 FB   Neck ROM: full  Mouth opening: > = 3 FB   Dental: normal exam         Pulmonary: breath sounds clear to auscultation  (+) shortness of breath (past two years, intermittent, no known cause, associated with tighness of throat, no other associated symptoms): chronic and no interval change,  sleep apnea: on CPAP,      (-) rhonchi and wheezes                          ROS comment: Has been prescribed inhaler but does not use it. Patient does not feel significant benefit from it. All pulm test wnl per pulmonologist.   Cardiovascular:  Exercise tolerance: good (>4 METS),   (+) hypertension:,     (-)  angina      Rhythm: regular  Rate: normal                 ROS comment: Transthoracic Echocardiography Report (TTE)      Demographics      Patient Name       Bala Benson      Date of Study      01/21/2022         Gender              Male      Patient Number     [id number]         Date of Birth       1970      Visit Number       [id number]          Age                 54 year(s)      Accession Number   [id number]         Room Number               Corporate ID       [id number]            Sonographer         Federica Rodriguez RVT, RUST      Ordering Physician Luci Stewart.                      MD                 Physician           Gwen Cross MD     Procedure     Type of Study      TTE procedure:ECHOCARDIOGRAM COMPLETE 2D W DOPPLER W COLOR.      Procedure Date  Date: 01/21/2022 Start: 02:30 PM     Study Location: [address] [facility]  Technical Quality: Adequate visualization     Indications:Chest pain and Dyspnea/SOB.     Additional Indications:HTN.     Patient Status: Routine     Height: 74 inches Weight: 245 pounds BSA: 2.37 m2 BMI: 31.46 kg/m2     BP: 130/78 mmHg      Conclusions      Summary   Normal left ventricle size, wall thickness, and systolic function with an   estimated ejection fraction of 60-65%. No regional wall motion abnormalities   are seen. Normal diastolic function. The right ventricle is normal in size and function. Trivial tricuspid regurgitation. The ascending aorta is mildly dilated measuring 3.6 cm. Signature      ------------------------------------------------------------------   Electronically signed by Aicha Carmona MD   (Interpreting physician) on 01/21/2022 at 04:17 PM   ------------------------------------------------------------------   1/2022  Stress echo no RWMA, low risk scan    Cardiac clearance in chart     Neuro/Psych:      (-) seizures, TIA and CVA           GI/Hepatic/Renal:   (+) GERD (occ gerd sx, none today):, liver disease (fatty liver):,           Endo/Other:    (+) : arthritis (ank spon):., .                 Abdominal:             Vascular:     - DVT and PE. Other Findings:           Anesthesia Plan      general     ASA 3       Induction: intravenous. MIPS: Postoperative opioids intended and Prophylactic antiemetics administered. Anesthetic plan and risks discussed with patient.                         Rebekah Alexander MD   6/13/2022

## 2022-06-13 NOTE — H&P
[facility]    HPI: 50year old male here for lap flavia    Past Medical History:   Diagnosis Date    Ankylosing spondylitis ([facility] [address]     Fatty liver     Hypertension     SOURAV on CPAP        Past Surgical History:   Procedure Laterality Date    HERNIA REPAIR Right 2000       Social History     Socioeconomic History    Marital status: Single     Spouse name: Not on file    Number of children: Not on file    Years of education: Not on file    Highest education level: Not on file   Occupational History    Not on file   Tobacco Use    Smoking status: Never Smoker    Smokeless tobacco: Never Used   Vaping Use    Vaping Use: Never used   Substance and Sexual Activity    Alcohol use: Not Currently     Alcohol/week: 0.0 standard drinks     Comment: minimal    Drug use: Never    Sexual activity: Not Currently   Other Topics Concern    Not on file   Social History Narrative    Not on file     Social Determinants of Health     Financial Resource Strain: Low Risk     Difficulty of Paying Living Expenses: Not hard at all   Food Insecurity: No Food Insecurity    Worried About Running Out of Food in the Last Year: Never true    [address] in the Last Year: Never true   Transportation Needs:     Lack of Transportation (Medical): Not on file    Lack of Transportation (Non-Medical):  Not on file   Physical Activity:     Days of Exercise per Week: Not on file    Minutes of Exercise per Session: Not on file   Stress:     Feeling of Stress : Not on file   Social Connections:     Frequency of Communication with Friends and Family: Not on file    Frequency of Social Gatherings with Friends and Family: Not on file    Attends Confucianist Services: Not on file    Active Member of Clubs or Organizations: Not on file    Attends Club or Organization Meetings: Not on file    Marital Status: Not on file   Intimate Partner Violence:     Fear of Current or Ex-Partner: Not on file    Emotionally Abused: Not on file  Physically Abused: Not on file    Sexually Abused: Not on file   Housing Stability:     Unable to Pay for Housing in the Last Year: Not on file    Number of Places Lived in the Last Year: Not on file    Unstable Housing in the Last Year: Not on file       Allergies: Allergies   Allergen Reactions    Pcn [Penicillins] Hives       Prior to Admission medications    Medication Sig Start Date End Date Taking? Authorizing Provider   aspirin 325 mg tablet Take 325 mg by mouth daily   Yes Historical Provider, MD   etodolac (LODINE) 400 MG tablet Take 1 tablet by mouth 2 times daily 4/14/22   Laura Minaya MD   PHENYLEPHRINE HCL NA by Nasal route Several times a day per patient  Patient not taking: Reported on 6/3/2022    Historical Provider, MD   Cetirizine HCl (ZYRTEC PO) Take by mouth as needed  Patient not taking: Reported on 6/3/2022    Historical Provider, MD       Active Problems:    * No active hospital problems. *  Resolved Problems:    * No resolved hospital problems. *      Height 6' 2\" (1.88 m), weight 250 lb (113.4 kg). Review of Systems    Physical Exam  Cardiovascular:      Rate and Rhythm: Normal rate and regular rhythm. Pulmonary:      Effort: Pulmonary effort is normal.      Breath sounds: Normal breath sounds.          Assessment:  Chronic flavia with stones    Plan:  Michelle Montiel MD  6/13/2022

## 2022-06-14 ENCOUNTER — TELEPHONE (OUTPATIENT)
Dept: ENDOCRINOLOGY | Age: 52
End: 2022-06-14

## 2022-06-14 DIAGNOSIS — R79.89 ABNORMAL CORTISOL LEVEL: Primary | ICD-10-CM

## 2022-06-14 DIAGNOSIS — R13.10 DYSPHAGIA, UNSPECIFIED TYPE: ICD-10-CM

## 2022-06-14 NOTE — OP NOTE
[address], [address] [place] [address]                                OPERATIVE REPORT    PATIENT NAME: Chantal Manley                      :        1970  MED REC NO:   [id number]                          ROOM:  ACCOUNT NO:   [de-identified]                           ADMIT DATE: 2022  PROVIDER:     Nazia Gomes MD    DATE OF PROCEDURE:  2022    PREOPERATIVE DIAGNOSES:  Chronic cholecystitis with cholelithiasis. POSTOPERATIVE DIAGNOSES:  Chronic cholecystitis with cholelithiasis. PROCEDURE PERFORMED:  Laparoscopic cholecystectomy with intraoperative  cholangiogram.    SURGEON:  Nazia Gomes MD.    ANESTHESIA:  General endotracheal and local.    ESTIMATED BLOOD LOSS:  Minimal.    COMPLICATIONS:  None. SPECIMEN:  Gallbladder. OPERATIVE INDICATIONS:  The patient is a 68-year-old male with  symptomatic cholelithiasis. He is brought to the operating room today  for laparoscopic cholecystectomy. He was explained the risks, benefits  and possible complications including the risks of bleeding, bowel  injury, bile duct injury or vascular injury to the liver. DETAILS OF PROCEDURE:  The patient was brought to the operative suite  and placed in a supine position on the operative table. After general  endotracheal anesthesia, he was prepped and draped in the usual sterile  fashion. We made a 5 mm curvilinear incision at the umbilicus. A Veress needle  was passed into the peritoneal cavity and after adequate insufflation, a  5 mm Optiview trocar was placed at this site. An 11 mm subxiphoid  trocar was placed, followed by two 5 mm trocars in the right upper  quadrant. The gallbladder was grasped via the lateral trocar sites. Dissection  was undertaken in the hilar region. The cystic duct was clearly  identified and dissected free circumferentially.   A clip was placed at  the gallbladder cystic duct junction. A small transverse opening was  then made in the duct. A cholangiogram catheter was brought through a  separate stab incision in the right upper quadrant, placed in the  opening of the duct. Intraoperative cholangiography revealed normal ductal anatomy with no  filling defect and easy flow into the duodenum. The catheter was then  removed. The cystic duct was then doubly clipped distally before we  transected. The cystic artery was doubly clipped proximally and singly  clipped distally and divided. There was a small posterior arterial  branch, which was singly clipped on the liver side and then divided on  the gallbladder side. The gallbladder was detached from the liver, placed in an EndoCatch bag  and then removed through the subxiphoid trocar site. Trocars were then  reinserted, and the liver bed was inspected for bleeding or bile leak. We cauterized a few areas. This gave us excellent hemostasis. After  irrigation of the right upper quadrant, all trocars were removed under  direct visualization, and the abdomen was de-insufflated. All the trocar sites had been previously injected with 0.5% Marcaine  with epinephrine. The skin at the incisions were closed with running  4-0 subcuticular sutures. Dermabond was then applied. The patient  tolerated the procedure without difficulty and was transferred to  recovery room in stable condition. Kia Shaffer.  Kathya Salazar MD    D: 06/13/2022 10:47:41       T: 06/13/2022 10:51:21     [id number]  Job#: [id number]     Doc#: [id number]    CC:  Renato Kenny MD

## 2022-06-15 ENCOUNTER — TELEPHONE (OUTPATIENT)
Dept: ENDOCRINOLOGY | Age: 52
End: 2022-06-15

## 2022-06-15 DIAGNOSIS — R79.89 ABNORMAL CORTISOL LEVEL: ICD-10-CM

## 2022-06-15 NOTE — TELEPHONE ENCOUNTER
Patient contacted the lab stating the labs are incorrect. Lab wants clarification on what lab orders they should be using.

## 2022-06-15 NOTE — TELEPHONE ENCOUNTER
Complains of a lump in his neck, I have ordered a thyroid ultrasound. Previously had issues with swallowing. He was again advised to do salivary cortisol before going to bed on 2 different nights a week apart he verbalizes understanding.   Patient needs to be scheduled a week after testing so that we can go over the results via telephone visit or a virtual visit

## 2022-06-15 NOTE — TELEPHONE ENCOUNTER
Spoke to lab. They stated the tests were not ordered incorrectly, but the urine steroid test was already completed in May. Looked at result and Dr. Renée Edwards made aware. Explained that the salivary cortisol tests would be the only tests needed to be done. No other questions at this time.

## 2022-06-15 NOTE — TELEPHONE ENCOUNTER
Please advise patient his testosterone level was normal, growth hormone levels and thyroid hormone levels were all within normal limits back in April 2022 due to his slightly elevated cortisol in the urine I had ordered a urine screen for synthetic steroids looks like the lab did not do it correctly. At this stage I would do at this stage I would order 2 salivary cortisol testing as well as a urine screen for synthetic steroids before doing any further work-up to make sure that his cortisol values are normal.  Patient needs to do salivary cortisol testing before going to bed on 2 different nights a week apart and also has to do a urine test for any interfering substances like steroids in his body.   I have placed all these orders  Also please schedule patient a follow-up visit which could be virtual to go over the results after he gets these testing done

## 2022-06-15 NOTE — TELEPHONE ENCOUNTER
Patient made aware. Patient will go to the lab. Patient is concerned about the lump in his throat and his trouble breathing. Patient stated it has been going on for a year. He is concerned.

## 2022-06-17 ENCOUNTER — HOSPITAL ENCOUNTER (OUTPATIENT)
Dept: ULTRASOUND IMAGING | Age: 52
Discharge: HOME | End: 2022-06-17
Payer: COMMERCIAL

## 2022-06-17 DIAGNOSIS — R13.10 DYSPHAGIA, UNSPECIFIED TYPE: ICD-10-CM

## 2022-06-17 PROCEDURE — 76536 US EXAM OF HEAD AND NECK: CPT

## 2022-06-22 ENCOUNTER — TELEPHONE (OUTPATIENT)
Dept: RHEUMATOLOGY | Age: 52
End: 2022-06-22

## 2022-06-22 DIAGNOSIS — R16.0 HEPATOMEGALY: Primary | ICD-10-CM

## 2022-06-22 NOTE — TELEPHONE ENCOUNTER
Pt states still not feeling well following gall bladder removal - has shortness of breath, swelling - and that this has been going on for quite a while. Would like Dr. Charlotte Melara to call him at [phone] to discuss possibly ordering additional testing. 170.18

## 2022-06-22 NOTE — TELEPHONE ENCOUNTER
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   There are 2 genetic markers but screening test cholestane trial.  I will check with the lab to see if they can process a sample for this test

## 2022-06-25 DIAGNOSIS — R79.89 ABNORMAL CORTISOL LEVEL: ICD-10-CM

## 2022-06-28 DIAGNOSIS — R16.0 HEPATOMEGALY: ICD-10-CM

## 2022-06-29 DIAGNOSIS — M45.7 ANKYLOSING SPONDYLITIS OF LUMBOSACRAL REGION (HCC): ICD-10-CM

## 2022-06-29 DIAGNOSIS — R06.02 SHORTNESS OF BREATH: ICD-10-CM

## 2022-06-29 DIAGNOSIS — I10 PRIMARY HYPERTENSION: ICD-10-CM

## 2022-06-29 LAB — CORTISOL SALIVARY: NORMAL UG/DL

## 2022-06-30 ENCOUNTER — TELEMEDICINE (OUTPATIENT)
Dept: ENDOCRINOLOGY | Age: 52
End: 2022-06-30
Payer: COMMERCIAL

## 2022-06-30 DIAGNOSIS — R79.89 ABNORMAL CORTISOL LEVEL: ICD-10-CM

## 2022-06-30 DIAGNOSIS — M45.7 ANKYLOSING SPONDYLITIS OF LUMBOSACRAL REGION (HCC): Primary | ICD-10-CM

## 2022-06-30 LAB — CORTISOL - AM: 18.5 UG/DL (ref 4.3–22.4)

## 2022-06-30 PROCEDURE — 99443 PR PHYS/QHP TELEPHONE EVALUATION 21-30 MIN: CPT | Performed by: INTERNAL MEDICINE

## 2022-06-30 RX ORDER — DEXAMETHASONE 1 MG
1 TABLET ORAL NIGHTLY
Qty: 1 TABLET | Refills: 0 | Status: SHIPPED | OUTPATIENT
Start: 2022-06-30 | End: 2022-07-25

## 2022-06-30 NOTE — PROGRESS NOTES
SUBJECTIVE:  Jemima Monsivais is a 50 y.o. male who was initially referred nonspecific symptoms of shortness of breath and chronic fatigue but due to clinical features suggestive of Cushing's I did 24-hour urine cortisol which came back abnormal..    Patient started having SOB , bloating ( started in 2020) , fatigue and headache. He also c/o high blood pressure and weight gain almost 40 lbs in the last one year . He denies any skin color changes. Has a red rash on his neck for years and has been evaluated by dermatologist   current complaints: fatigue, weight gain, palpitations, sweating, heat intolerance  History of obstructive symptoms: difficulty swallowing No, changes in voice/hoarseness No.    History of radiation to patient's neck: NO  Recent iodine exposure: No  Family history includes no thyroid abnormalities. Family history of thyroid cancer: No  He has hypertension and was previously taking HCTZ and captopril but they were stopped after he ad a normal echocardiogram.  Blood pressure was high on initial evaluation. Patient is advised to check his blood pressure at home and call me back if it stays elevated. He has been on steroid off and on for breathing issues and was getting steroid tapers. He has been to pulmonologist in the past ad has bee diagnosed with Sleep apnea and wears masks. He was given some inhalers which didn't help at all. He has been diagnosed with ankylosing spondylitis and was prescribed sulfasalazine and Taltz by Dr Raphael Pierre. He has not started using these medications yet.         Past Medical History:   Diagnosis Date    [facility]ylosing sp[address]yl[address] ([facility] [address]     Fatty liver     Hypertension     SOURAV on CPAP      Patient Active Problem List    Diagnosis Date Noted    Calculus of gallbladder without cholecystitis without obstruction     RUQ pain 01/12/2022    Anxiety 12/29/2021    Primary hypertension 12/29/2021    SOURAV (obstructive sleep apnea)     Hypersomnia 11/09/2020    Shortness of breath 11/09/2020    Alpha-1-antitrypsin deficiency carrier 11/09/2020    Rash 11/09/2020    Ankylosing spondylitis ([facility] [address] 07/13/2011    Dysfunction of eustachian tube 08/10/2010     Past Surgical History:   Procedure Laterality Date    CHOLECYSTECTOMY, LAPAROSCOPIC N/A 6/13/2022    LAPAROSCOPIC CHOLECYSTECTOMY WITH CHOLANGIOGRAM performed by Ulysses Stade, MD at [address] [place] [address]     Family History   Problem Relation Age of Onset    Liver Disease Mother              Atrial Fibrillation Father     Diabetes type 2  Father     Arthritis Father     Hypertension Father      Social History     Socioeconomic History    Marital status: Single     Spouse name: None    Number of children: None    Years of education: None    Highest education level: None   Occupational History    None   Tobacco Use    Smoking status: Never Smoker    Smokeless tobacco: Never Used   Vaping Use    Vaping Use: Never used   Substance and Sexual Activity    Alcohol use: Not Currently     Alcohol/week: 0.0 standard drinks     Comment: minimal    Drug use: Never    Sexual activity: Not Currently   Other Topics Concern    None   Social History Narrative    None     Social Determinants of Health     Financial Resource Strain: Low Risk     Difficulty of Paying Living Expenses: Not hard at all   Food Insecurity: No Food Insecurity    Worried About Running Out of Food in the Last Year: Never true    [place] of Food in the Last Year: Never true   Transportation Needs:     Lack of Transportation (Medical): Not on file    Lack of Transportation (Non-Medical):  Not on file   Physical Activity:     Days of Exercise per Week: Not on file    Minutes of Exercise per Session: Not on file   Stress:     Feeling of Stress : Not on file   Social Connections:     Frequency of Communication with Friends and Family: Not on file    Frequency of Social Gatherings with Friends and Family: Not on file   [address] [place] [address] Attends [employer] Services: Not on file    Active Member of Clubs or Organizations: Not on file    Attends Club or Organization Meetings: Not on file    Marital Status: Not on file   Intimate Partner Violence:     Fear of Current or Ex-Partner: Not on file    Emotionally Abused: Not on file    Physically Abused: Not on file    Sexually Abused: Not on file   Housing Stability:     Unable to Pay for Housing in the Last Year: Not on file    Number of [place] in the Last Year: Not on file    Unstable Housing in the Last Year: Not on file     Current Outpatient Medications   Medication Sig Dispense Refill    dexamethasone (DECADRON) 1 MG tablet Take 1 tablet by mouth nightly Take 1 mg,orally between 11 PM and midnight, and a single blood sample is drawn at 8 AM the next morning for assay of serum cortisol and, if available, serum dexamethasone 1 tablet 0    Cetirizine HCl (ZYRTEC PO) Take by mouth as needed        No current facility-administered medications for this visit. Allergies   Allergen Reactions    Pcn [Penicillins] Hives         Review of Systems:  I have reviewed the review of system questionnaire filled by the patient . Patient was advised to contact PCP for non endocrine signs and symptoms       OBJECTIVE:   There were no vitals taken for this visit. Wt Readings from Last 3 Encounters:   06/13/22 252 lb (114.3 kg)   05/13/22 265 lb (120.2 kg)   05/05/22 268 lb (121.6 kg)       Physical Exam:  This was a phone conversation in [place] of in-person visit due to coronavirus emergency. Patient provided verbal consent for an \"over the phone visit'. Location of patient; home  Total time spent  minutes on this call conducting an interview, collecting data and reviewing her chart, performing a limited exam by phone and educating the patient on my assessment and plan.       Lab Review:  Lab Results   Component Value Date/Time    TSH 0.97 04/14/2022 09:24 AM    TSH 1.36 03/03/2022 02:54 PM    TSH 0.78 08/12/2020 03:49 PM     Lab Results   Component Value Date/Time    T4FREE 1.3 04/14/2022 09:24 AM        ASSESSMENT/PLAN:  1. Abnormal 24-hour urine cortisol  Patient was noted to have a urine cortisol value of 70 the upper limit of normal in this [facility] lab was less than 60 this was done in April 2022  He does not have episodic symptoms suggestive of pheochromocytoma or any symptoms suggestive of adrenal insufficiency. Work-up for high blood pressure including plasma fractionated catecholamines tryptase and serotonin was within normal limits in April 2022. 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 For which the 24-hour urine cortisol was ordered. Since it was abnormal I advised him to do 2 salivary cortisol one of them is resulted on June 24 but it is contaminated with blood so not accurate he just submitted the second set of salivary cortisol results pending  He also did an a.m. cortisol along  which is pending. Patient will get 1 mg dexamethasone suppression test which was explained to the patient in great detail he verbalized understanding of the timeline. He will also repeat a 24-hour urine cortisol in 2 to 3 weeks. 2. Shortness of breath  Previously was evaluated by 2 pulmonologist and had abnormal sleep studies and was prescribed CPAP mask with no improvement in current symptoms. Previously was also prescribed inhalers which did not help with the shortness of breath. 3. Ankylosing spondylitis of lumbosacral region [facility])  Patient follows with rheumatologist and was prescribed taltz and sulfasalazine he is not taking any of those he was also prescribed etodolac.       Reviewed and/or ordered clinical lab results Yes  Reviewed and/or ordered radiology tests Yes   Reviewed and/or ordered other diagnostic tests Yes  Made a decision to obtain old records Yes  Reviewed and summarized old records Yes      John Boston was counseled regarding symptoms of current diagnosis, course and complications of disease if inadequately treated, side effects of medications, diagnosis, treatment options, and prognosis, risks, benefits, complications, and alternatives of treatment, labs, imaging and other studies and treatment targets and goals. TELEHEALTH EVALUATION -- Audio/Visual (During CBACD-46 public health emergency)  Pursuant to the emergency declaration under the [facility] waiver authority and the [employer] Act, this Virtual  Visit was conducted, with patient's consent, to reduce the patient's risk of exposure to COVID-19 and provide care for  patient. Patient identification was verified and the caregiver was present when appropriate. The patient was located in the state where the provider is licensed to practice. The patient and/or guardian are aware that this is a billable service which includes applicable co-pays. Thisaudio visit was conducted with patient and/or legal guardian's consent. Total time spent : 30+ reviewing the chart, conducting an interview, performing a limited exam by video and educating the patient on my assessment plan. Return in about 3 months (around 9/30/2022). Please note that some or all of this report was generated using voice recognition software. Please notify me in case of any questions about the content of this document, as some errors in transcription may have occurred .

## 2022-07-01 DIAGNOSIS — R79.89 ABNORMAL CORTISOL LEVEL: ICD-10-CM

## 2022-07-01 LAB — CORTISOL TOTAL: 2.3 UG/DL

## 2022-07-05 ENCOUNTER — OFFICE VISIT (OUTPATIENT)
Dept: ENT CLINIC | Age: 52
End: 2022-07-05
Payer: COMMERCIAL

## 2022-07-05 VITALS
HEART RATE: 73 BPM | WEIGHT: 244 LBS | TEMPERATURE: 97.5 F | BODY MASS INDEX: 31.33 KG/M2 | SYSTOLIC BLOOD PRESSURE: 127 MMHG | DIASTOLIC BLOOD PRESSURE: 86 MMHG

## 2022-07-05 DIAGNOSIS — J31.0 RHINITIS MEDICAMENTOSA: ICD-10-CM

## 2022-07-05 DIAGNOSIS — R09.89 THROAT TIGHTNESS: Primary | ICD-10-CM

## 2022-07-05 DIAGNOSIS — T48.5X5A RHINITIS MEDICAMENTOSA: ICD-10-CM

## 2022-07-05 DIAGNOSIS — J34.2 DNS (DEVIATED NASAL SEPTUM): ICD-10-CM

## 2022-07-05 DIAGNOSIS — Z99.89 OSA ON CPAP: ICD-10-CM

## 2022-07-05 DIAGNOSIS — G47.33 OSA ON CPAP: ICD-10-CM

## 2022-07-05 DIAGNOSIS — J34.89 NASAL MUCOSA DRY: ICD-10-CM

## 2022-07-05 DIAGNOSIS — R04.0 EPISTAXIS: ICD-10-CM

## 2022-07-05 DIAGNOSIS — R06.00 DYSPNEA, UNSPECIFIED TYPE: ICD-10-CM

## 2022-07-05 LAB — CORTISOL SALIVARY: 0.03 UG/DL

## 2022-07-05 PROCEDURE — 31575 DIAGNOSTIC LARYNGOSCOPY: CPT | Performed by: STUDENT IN AN ORGANIZED HEALTH CARE EDUCATION/TRAINING PROGRAM

## 2022-07-05 PROCEDURE — 99204 OFFICE O/P NEW MOD 45 MIN: CPT | Performed by: STUDENT IN AN ORGANIZED HEALTH CARE EDUCATION/TRAINING PROGRAM

## 2022-07-05 RX ORDER — ECHINACEA PURPUREA EXTRACT 125 MG
2 TABLET ORAL
Qty: 1 EACH | Refills: 5 | Status: SHIPPED | OUTPATIENT
Start: 2022-07-05 | End: 2022-07-25

## 2022-07-05 RX ORDER — BACITRACIN ZINC AND POLYMYXIN B SULFATE 500; 1000 [USP'U]/G; [USP'U]/G
OINTMENT TOPICAL
Qty: 1 EACH | Refills: 1 | Status: SHIPPED | OUTPATIENT
Start: 2022-07-05 | End: 2022-07-25

## 2022-07-05 NOTE — PATIENT INSTRUCTIONS
DR. Easton Hough NOSEBLEED INSTRUCTIONS:    - Obtain nasal saline spray over the counter. Spray nasal saline spray or saline gel multiple times a day (up to 5-6 times throughout the day) in each nostril to help with nasal mucosal moisturization for the next 3 weeks. You can get this over the counter. You cannot over use this!  - Use a neti pot with saline solution at least once a day  - Stop intermittent Aspirin use  - Place antibiotic ointment (I.e. polysporin, triple antiobiotic ointment, bactroban) to inside of both nostrils and along the nasal septum three times daily for next 6 weeks then as needed for moisturization.  - Consider humidification machine in the bedroom to help with nasal moisturization  - Avoid nasal trauma nose picking, harsh nasal blowing, rubbing nose after blowing! If you need to blow your nose blow very gently and do not harshly wipe nose afterwards. - In case of another nosebleed: First spray AFRIN (can obtain over the counter) in affected nostril, then saturate a cottonball with Afrin and place it on the affected side and then placing unrelenting digital pressure for at least 15 minutes. After this take the cottonball allowed and reinspected. If still bleeding please repeat. If Bleeding does not stop after 1 hour or you lose a large amount of blood go to emergency department. ONLY USE AFRIN FOR SEVERE BLEEDING.

## 2022-07-05 NOTE — PROGRESS NOTES
[facility] PATIENT HISTORY AND PHYSICAL NOTE      Patient Name: Virgil Epperson Record Number:  [id number]  Primary Care Physician:  Simona Morales MD    ChiefComplaint     Chief Complaint   Patient presents with    Other     cant get a deep breath, some bleeding in the nose, swelling in the throat, having trouble swallowing       History of Present Illness     Grant Dempsey is an 50 y.o. male presenting with throat tightness and shortness of breath. Feels swollen in neck and chest. Causing breathing issues. Going on for 2 years, unchanged. Comes and goes. +nasal congestion. + environmental allergies for which he takes zyrtec-D and nasal sprays for on and off in the past. Nasal sprays include phenylephrine daily for the past few years. He is trying to cut down on this, was on it multiple times a day in the past, now only using it once a day. Has tried flonase in the past.  He does get frequent left-sided nosebleeds. He had nasal cauterization performed by Dr. Le Lazaro many years ago. Takes aspirin randomly for headaches. He wears a fullface CPAP at night for SOURAV. No history of known environmental allergies although he is never been tested. He follows with multiple physicians concerning his shortness of breath including pulmonology and endocrinology. He also follows with Dr. Ramiro Al for ankylosing spondylitis. No recent antibiotics or steroids. No stridor or noisy breathing.     Past Medical History     Past Medical History:   Diagnosis Date    [facility] sp[facility] ([facility] [address]     Fatty liver     Hypertension     SOURAV on CPAP        Past Surgical History     Past Surgical History:   Procedure Laterality Date    CHOLECYSTECTOMY, LAPAROSCOPIC N/A 6/13/2022    LAPAROSCOPIC CHOLECYSTECTOMY WITH CHOLANGIOGRAM performed by Galdino Drew MD at [facility] [address] 2000       Family History     Family History   Problem Relation Age of Onset    Liver Disease Mother              Atrial Fibrillation Father     Diabetes type 2  Father     Arthritis Father     Hypertension Father        Social History     Social History     Tobacco Use    Smoking status: Never Smoker    Smokeless tobacco: Never Used   Vaping Use    Vaping Use: Never used   Substance Use Topics    Alcohol use: Not Currently     Alcohol/week: 0.0 standard drinks     Comment: minimal    Drug use: Never        Allergies     Allergies   Allergen Reactions    Pcn [Penicillins] Hives       Medications     Current Outpatient Medications   Medication Sig Dispense Refill    sodium chloride (OCEAN) 0.65 % nasal spray 2 sprays by Nasal route 5 times daily 1 each 5    bacitracin-polymyxin b (POLYSPORIN) [id number] UNIT/GM ointment Apply topically three times daily to inside of both nostrils for 3 weeks then as needed for dry nose. 1 each 1    dexamethasone (DECADRON) 1 MG tablet Take 1 tablet by mouth nightly Take 1 mg,orally between 11 PM and midnight, and a single blood sample is drawn at 8 AM the next morning for assay of serum cortisol and, if available, serum dexamethasone 1 tablet 0    Cetirizine HCl (ZYRTEC PO) Take by mouth as needed  (Patient not taking: Reported on 7/5/2022)       No current facility-administered medications for this visit.        Review of Systems     REVIEW OF SYSTEMS    See HPI Above    PhysicalExam     Vitals:    07/05/22 1404   BP: 127/86   Site: Right Upper Arm   Position: Sitting   Cuff Size: Medium Adult   Pulse: 73   Temp: 97.5 °F (36.4 °C)   TempSrc: Axillary   Weight: 244 lb (110.7 kg)       PHYSICAL EXAM  /86 (Site: Right Upper Arm, Position: Sitting, Cuff Size: Medium Adult)   Pulse 73   Temp 97.5 °F (36.4 °C) (Axillary)   Wt 244 lb (110.7 kg)   BMI 31.33 kg/m²     GENERAL: No acute distress, alert and oriented  EYES: EOMI, Anti-icteric  NOSE: On anterior rhinoscopy bilateral nasal passages are exquisitely dry, left nasal passage with copious amounts of dried dark blood. Nasal septum deviated to the left. EARS: Normal external appearance; on portable otomicroscopy:     -Ad: External auditory canal without stenosis, tympanic membrane clear, no middle ear effusions or retractions.      -As: External auditory canal without stenosis, tympanic membrane clear, no middle ear effusions or retractions. Pneumatic otoscopy: Bilateral tympanic membranes mobile pneumatic otoscopy  FACE: HB 1/6 bilaterally, symmetric appearing, sensation equal bilaterally  ORAL CAVITY: No masses or lesions visualized or palpated, uvula is midline, moist mucous membranes, no oropharyngeal masses or oropharyngeal obstruction  NECK: Normal range of motion, no thyromegaly, trachea is midline, no palpable lymphadenopathy or neck masses, no crepitus  NEURO: Cranial Nerves 2, 3, 4, 5, 6, 7, 11, 12 grossly intact bilaterally     I have performed a head and neck physical exam personally or was physically present during the key or critical portions of the service. Procedure     Pocedure: Flexible Laryngoscopy    Pre-op: Throat tightness, dyspnea  Post-op: Same  Procedure : Flexible Nasopharyngolaryngoscopy  Surgeon: Dr. Maria L Emery, DO  Anesthesia: Afrin with 2% lidocaine  Estimated Blood Loss: None    Description of Procedure:  After obtaining consent, the patient was placed in the examination chair in the upright position. Decongestant and topical anesthetic was sprayed in the bilateral nares and after allowing adequate time for hemostatic effect, the flexible 4 mm laryngoscope was passed via the bilateral nasal passages.     Nasal Septum: Nasal septum deviated to the left with diffuse dryness along the bilateral nasal septal mucosa  Nasal Findings: Diffuse amounts of dried blood along the left nasal passage, no active bleeding  Nasopharynx: Clear, no masses or inflammation  Oropharynx:No exophytic masses  Base of Tongue: Lingual tonsils mildly hypertrophied without exophytic masses, vallecula without effacement  Epiglottis: Upright, in normal anatomical position  True Vocal Cord: Anatomically normal, no masses or inflammation, normal abduction and adduction upon inspiration and phonation  False Vocal Cord: normal appearing without masses  Hypopharynx Mucosa: No masses or inflammation of the piriform sinuses or postcricoid area  Arytenoids: Normal mucosa, no dislocation appreciated     * Patient tolerated the procedure well with no complications   * Patient was instructed not to eat for 30 minutes following procedure. * Patient was instructed that they may notice minor bleeding. Attestation:   I was present for the entire viewing, including introduction and removal of the scope. Assessment and Plan     1. Throat tightness  2. Dyspnea, unspecified type  -Upper airway grossly patent. He does have significant amount of nasal congestion and nasal mucosal dryness which may be contributing to sensation of dyspnea. See treatment below for this. Continue follow-up with pulmonology and endocrinology. 3. Rhinitis medicamentosa  -Stop phenylephrine nasal spray use! 4. Epistaxis  5. Nasal mucosa dry  -Epistaxis and dry mucosa likely secondary to years of phenylephrine spray use as well as use of SOURAV  -Stop intermittent aspirin use  -Start sinonasal saline irrigations at least once a day along with nasal saline sprays throughout the day to help moisturize nasal mucosa  - Obtain humidifier and place at bedside  -Follow-up in 1 month for reevaluation    6. SOURAV on CPAP  7. DNS (deviated nasal septum)  -See above      Follow Up     Return in about 1 month (around 8/5/2022). [place] [id number]  Department of Otolaryngology/Head & Neck Surgery  7/5/22    Medical Decision Making:   The following items were considered in medical decision making:  Independent review of images  Review / order clinical lab tests  Review / order radiology tests  Decision to obtain old records    This note was generated completely or in part utilizing Dragon dictation speech recognition software. Occasionally, words are mistranscribed and despite editing, the text may contain inaccuracies due to incorrect word recognition. If further clarification is needed please contact the office at [phone].

## 2022-07-06 LAB — ADRENOCORTICOTROPIC HORMONE: 15 PG/ML (ref 7–69)

## 2022-07-09 LAB — MISCELLANEOUS LAB TEST ORDER: NORMAL

## 2022-07-11 ENCOUNTER — TELEPHONE (OUTPATIENT)
Dept: ENDOCRINOLOGY | Age: 52
End: 2022-07-11

## 2022-07-11 DIAGNOSIS — R79.89 ABNORMAL CORTISOL LEVEL: Primary | ICD-10-CM

## 2022-07-11 NOTE — TELEPHONE ENCOUNTER
Called patient and discussed the results of his abnormal 1 mg dexamethasone suppression test done on 1 July 2022 patient had a dexamethasone level of 180 ng/dL which was adequate after overnight dose, his morning cortisol value was 2.3 mcg/dL whereas the cutoff is 1.8. Previously on May 12, 2022 patient had urine screen for synthetic steroids done which was negative. His salivary cortisol value was 0.1 to 4 mcg/dL but this had some blood contamination which can spuriously elevate these values.   Patient had elevated cortisol 24-hour urine values of 70.7 in May April 2022, at the time his plasma catecholamines were normal thyroid antibodies were negative tryptase and serotonin values were within normal limits

## 2022-07-13 LAB
Lab: NORMAL
REPORT: NORMAL
THIS TEST SENT TO: NORMAL

## 2022-07-15 DIAGNOSIS — R79.89 ABNORMAL CORTISOL LEVEL: ICD-10-CM

## 2022-07-15 LAB
CORTISOL TOTAL: 12.9 UG/DL
PROLACTIN: 8.4 NG/ML

## 2022-07-19 LAB
ADRENOCORTICOTROPIC HORMONE: 29 PG/ML (ref 7–69)
CORTISOL (UR), FREE: 60.8 UG/D
CORTISOL URINE, FREE (/G CRT): 24.5 UG/G CRT
CORTISOL,F,UG/L,U: 46.8 UG/L
CREATININE 24 HOUR URINE: 2483 MG/D (ref 1000–2500)
CREATININE URINE: 191 MG/DL
HOURS COLLECTED: 24
INTERPRETATION: ABNORMAL
URINE TOTAL VOLUME: 1300

## 2022-07-25 ENCOUNTER — SCHEDULED TELEPHONE ENCOUNTER (OUTPATIENT)
Dept: ENDOCRINOLOGY | Age: 52
End: 2022-07-25
Payer: COMMERCIAL

## 2022-07-25 DIAGNOSIS — I10 PRIMARY HYPERTENSION: ICD-10-CM

## 2022-07-25 DIAGNOSIS — G47.33 OSA (OBSTRUCTIVE SLEEP APNEA): ICD-10-CM

## 2022-07-25 DIAGNOSIS — M45.7 ANKYLOSING SPONDYLITIS OF LUMBOSACRAL REGION (HCC): ICD-10-CM

## 2022-07-25 DIAGNOSIS — R79.89 ABNORMAL CORTISOL LEVEL: Primary | ICD-10-CM

## 2022-07-25 PROCEDURE — 99443 PR PHYS/QHP TELEPHONE EVALUATION 21-30 MIN: CPT | Performed by: INTERNAL MEDICINE

## 2022-07-25 NOTE — Clinical Note
Please schedule patient a follow-up visit in 3 months and mail him the orders for MRI brain that I ordered

## 2022-07-25 NOTE — PROGRESS NOTES
SUBJECTIVE:  Norma Beverly is a 50 y.o. male who was initially referred here for nonspecific symptoms of shortness of breath and chronic fatigue but during the interview his abnormal weight gain prompted me to test for hypercortisolism. Patient started having SOB , bloating ( started in 2020) , fatigue and headache. He also c/o high blood pressure and weight gain almost 40 lbs in the last one year . He denies any skin color changes. Has a red rash on his neck for years and has been evaluated by dermatologist   current complaints: fatigue, weight gain, palpitations, sweating, heat intolerance  History of obstructive symptoms: difficulty swallowing No, changes in voice/hoarseness No.    History of radiation to patient's neck: NO  Recent iodine exposure: No  Family history includes no thyroid abnormalities. Family history of thyroid cancer: No    He has hypertension and was previously taking HCTZ and captopril but they were stopped after he ad a normal echocardiogram.  Blood pressure was high on initial evaluation. Patient is advised to check his blood pressure at home and call me back if it stays elevated. He has been on steroid off and on for breathing issues and was getting steroid tapers. He has been to pulmonologist in the past ad has bee diagnosed with Sleep apnea and wears masks. He was given some inhalers which didn't help at all. He has been diagnosed with ankylosing spondylitis and was prescribed sulfasalazine and Taltz by Dr Paty Stone. He has not started using these medications yet. Interim history  Patient completed his 24-hour urine collection x2 and has also done a 1 mg dexamethasone suppression test and both of these testing were abnormal pointing towards hypercortisolism. Patient does have slightly uncontrolled hypertension but no classic symptoms of Cushing's. He still continues to struggle with breathing problems.     Past Medical History:   Diagnosis Date    Ankylosing spondylitis ([facility]     Fatty liver     Hypertension     SOURAV on CPAP      Patient Active Problem List    Diagnosis Date Noted    Calculus of gallbladder without cholecystitis without obstruction     RUQ pain 01/12/2022    Anxiety 12/29/2021    Primary hypertension 12/29/2021    SOURAV (obstructive sleep apnea)     Hypersomnia 11/09/2020    Shortness of breath 11/09/2020    Alpha-1-antitrypsin deficiency carrier 11/09/2020    Rash 11/09/2020    Ankylosing spondylitis ([facility] 07/13/2011    Dysfunction of eustachian tube 08/10/2010     Past Surgical History:   Procedure Laterality Date    CHOLECYSTECTOMY, LAPAROSCOPIC N/A 6/13/2022    LAPAROSCOPIC CHOLECYSTECTOMY WITH CHOLANGIOGRAM performed by Rebecca Marrero MD at [address] [place] [address]     Family History   Problem Relation Age of Onset    Liver Disease Mother              Atrial Fibrillation Father     Diabetes type 2  Father     Arthritis Father     Hypertension Father      Social History     Socioeconomic History    Marital status: Single     Spouse name: None    Number of children: None    Years of education: None    Highest education level: None   Tobacco Use    Smoking status: Never    Smokeless tobacco: Never   Vaping Use    Vaping Use: Never used   Substance and Sexual Activity    Alcohol use: Not Currently     Alcohol/week: 0.0 standard drinks     Comment: minimal    Drug use: Never    Sexual activity: Not Currently     Social Determinants of Health     Financial Resource Strain: Low Risk     Difficulty of Paying Living Expenses: Not hard at all   Food Insecurity: No Food Insecurity    Worried About Running Out of Food in the Last Year: Never true    Ran Out of Food in the Last Year: Never true     No current outpatient medications on file. No current facility-administered medications for this visit.      Allergies   Allergen Reactions    Pcn [Penicillins] Hives         Review of Systems:  I have reviewed the review of system questionnaire 60mcg/dL. -- On July 15 patient had morning cortisol drawn at 7:23 AM with a value of 12.9 and a concomitant ACTH of 29. Due to nonsuppressed ACTH will proceed with imaging. Previously patient had a CT scan of abdomen done in April 2021 in which adrenal glands were reported as unremarkable. Will repeat salivary cortisol and order MRI brain as his ACTH continues to be not suppressed. 2. Shortness of breath  Previously was evaluated by 2 pulmonologist and had abnormal sleep studies and was prescribed CPAP mask with no improvement in current symptoms. Previously was also prescribed inhalers which did not help with the shortness of breath. 3. Ankylosing spondylitis of lumbosacral region [facility])  Patient follows with rheumatologist and was prescribed taltz and sulfasalazine he is not taking any of those he was also prescribed etodolac. Reviewed and/or ordered clinical lab results Yes  Reviewed and/or ordered radiology tests Yes   Reviewed and/or ordered other diagnostic tests Yes  Made a decision to obtain old records Yes  Reviewed and summarized old records Yes      Mayra Byers was counseled regarding symptoms of current diagnosis, course and complications of disease if inadequately treated, side effects of medications, diagnosis, treatment options, and prognosis, risks, benefits, complications, and alternatives of treatment, labs, imaging and other studies and treatment targets and goals. He understands instructions and counseling. TELEHEALTH EVALUATION -- Audio/Visual ([facility])  Pursuant to the emergency declaration under the [facility], [facility]5 waiver authority and the [employer] and [employer] Act, this Virtual  Visit was conducted, with patient's consent, to reduce the patient's risk of exposure to COVID-19 and provide care for  patient.      Patient identification was verified and the caregiver was present when appropriate. The patient was located in the state where the provider is licensed to practice. The patient and/or guardian are aware that this is a billable service which includes applicable co-pays. This  audio visit was conducted with patient and/or legal guardian's consent. Total time spent : 20+ reviewing the chart, conducting an interview, performing a limited exam by audio and educating the patient on my assessment plan. Return in about 3 months (around 10/25/2022). Please note that some or all of this report was generated using voice recognition software. Please notify me in case of any questions about the content of this document, as some errors in transcription may have occurred .

## 2022-07-29 ENCOUNTER — TELEPHONE (OUTPATIENT)
Dept: ENDOCRINOLOGY | Age: 52
End: 2022-07-29

## 2022-07-29 NOTE — TELEPHONE ENCOUNTER
Patient states he has a lump in his throat and has difficulty swallowing and breathing. He has been to the ER. Would like to know what Dr. Ruth Levine advises him to do. Patient would like a call back today asap.

## 2022-07-29 NOTE — TELEPHONE ENCOUNTER
Returned patients call. Asked patient what day he went to the ER and he states \"its been awhile, maybe 2 years ago. \" Patient states he has been having this problem for 2 years now and they can't find out what is wrong with him. He wants to know if it is a thyroid issue. I explained that Dr. Jackie Vasquez and him have already been discussing these issues at his last few visits over the past few months and that his thyroid US was normal. I explained that Dr. Jackie Vasquez is out of the office until 8/8/2022. Patient states he is having trouble breathing, has a lump in his throat, and is breaking out in sweats. Patient denies any chest pain. I advised patient that if he feels like he is having trouble breathing, he needs to go to the ER. I also advised him that he can call his primary care physician to be evaluated as well and that I would forward the message to Dr. Jackie Vasquez for when she returns. Patient verbalized understanding. No other questions or concerns at this time.

## 2022-08-08 ENCOUNTER — OFFICE VISIT (OUTPATIENT)
Dept: ENT CLINIC | Age: 52
End: 2022-08-08
Payer: COMMERCIAL

## 2022-08-08 ENCOUNTER — HOSPITAL ENCOUNTER (OUTPATIENT)
Dept: MRI IMAGING | Age: 52
Discharge: HOME | End: 2022-08-08
Payer: COMMERCIAL

## 2022-08-08 VITALS — TEMPERATURE: 97.3 F | HEART RATE: 87 BPM | SYSTOLIC BLOOD PRESSURE: 137 MMHG | DIASTOLIC BLOOD PRESSURE: 82 MMHG

## 2022-08-08 DIAGNOSIS — J34.2 DNS (DEVIATED NASAL SEPTUM): ICD-10-CM

## 2022-08-08 DIAGNOSIS — R09.89 THROAT TIGHTNESS: ICD-10-CM

## 2022-08-08 DIAGNOSIS — R04.0 EPISTAXIS: Primary | ICD-10-CM

## 2022-08-08 DIAGNOSIS — R79.89 ABNORMAL CORTISOL LEVEL: ICD-10-CM

## 2022-08-08 DIAGNOSIS — G47.33 OSA ON CPAP: ICD-10-CM

## 2022-08-08 DIAGNOSIS — J34.89 NASAL MUCOSA DRY: ICD-10-CM

## 2022-08-08 DIAGNOSIS — Z99.89 OSA ON CPAP: ICD-10-CM

## 2022-08-08 PROCEDURE — 99214 OFFICE O/P EST MOD 30 MIN: CPT | Performed by: STUDENT IN AN ORGANIZED HEALTH CARE EDUCATION/TRAINING PROGRAM

## 2022-08-08 PROCEDURE — 70553 MRI BRAIN STEM W/O & W/DYE: CPT | Performed by: INTERNAL MEDICINE

## 2022-08-08 PROCEDURE — A9577 INJ MULTIHANCE: HCPCS | Performed by: INTERNAL MEDICINE

## 2022-08-08 PROCEDURE — 6360000004 HC RX CONTRAST MEDICATION: Performed by: INTERNAL MEDICINE

## 2022-08-08 RX ADMIN — GADOBENATE DIMEGLUMINE 20 ML: 529 INJECTION, SOLUTION INTRAVENOUS at 07:23

## 2022-08-08 NOTE — PROGRESS NOTES
[facility] FOLLOW-UP VISIT      Patient Name: Virgil Epperson Record Number:  [id number]  Primary Care Physician:  Olamide Oneill MD    ChiefComplaint     Chief Complaint   Patient presents with    Follow-up     Still having nose bleeding,        History of Present Illness     Jaleesa Fuchs is an 50 y.o. male previously seen for throat tightness, dyspnea, rhinitis medicamentosa, epistaxis, dry nasal mucosa, SOURAV on CPAP, deviated nasal septum. Interval History:   Continues to have shortness of breath, headache, fatigue. All the symptoms started in June 2020. Since June 2020 he has noted improvement of symptoms but just has not gone away. He just wants to get back to how he was back then. He feels like when he takes a deep breath it just does not fill up his lungs as it used to. No noisy breathing, no stridor or stertor. Has stopped phenylephrine use. Still getting left sided bloody noses. Using nasal saline sprays and salve daily. Using nasal salve approximately once a day, he misses days sometimes. He has been on omeprazole and Nexium in the past.  He states that he cannot take these more than 3 days as they cause him to have a lump in his throat and make him feel more swollen in his neck and chest.    PFTs in the past at [facility] were negative. His pulmonologist had recommended to him to repeat these but he never had this done. He is also seen PARKER Lopez, who is recommending repeat upper endoscopy but patient states that he has had 2 -ones in the past.    Has humidifier for CPAP but does not use it. When he used it in past is when his issues started. No daily thinners or anticoagulants. When he does take NSAIDS for headaches it will make his nosebleeds worse.      Past Medical History     Past Medical History:   Diagnosis Date    Ankylosing spondylitis ([facility]     Fatty liver     Hypertension     SOURAV on CPAP        Past Surgical History     Past Surgical History:   Procedure Laterality Date    CHOLECYSTECTOMY, LAPAROSCOPIC N/A 6/13/2022    LAPAROSCOPIC CHOLECYSTECTOMY WITH CHOLANGIOGRAM performed by Jaspreet Amato MD at [address] [facility] [address]       Family History     Family History   Problem Relation Age of Onset    Liver Disease Mother              Atrial Fibrillation Father     Diabetes type 2  Father     Arthritis Father     Hypertension Father        Social History     Social History     Tobacco Use    Smoking status: Never    Smokeless tobacco: Never   Vaping Use    Vaping Use: Never used   Substance Use Topics    Alcohol use: Not Currently     Alcohol/week: 0.0 standard drinks     Comment: minimal    Drug use: Never        Allergies     Allergies   Allergen Reactions    Pcn [Penicillins] Hives       Medications     No current outpatient medications on file. No current facility-administered medications for this visit. Review of Systems     REVIEW OF SYSTEM: See HPI above    PhysicalExam     Vitals:    08/08/22 1438   BP: 137/82   Pulse: 87   Temp: 97.3 °F (36.3 °C)   TempSrc: Temporal       PHYSICAL EXAM  /82   Pulse 87   Temp 97.3 °F (36.3 °C) (Temporal)     GENERAL: No acute distress, alert and oriented. EYES: EOMI, Anti-icteric. NOSE: On anterior rhinoscopy there is bloody scabbing and dried blood along left nasal vestibule. No active epistaxis. His mucosa dry bilaterally. Nasal septum deviated to the left.   EARS: Normal external appearance; on portable otomicroscopy:     -Ad: External auditory canal without stenosis, tympanic membrane clear, no middle ear effusions or retractions     -As: External auditory canal without stenosis, tympanic membrane clear, no middle ear effusions or retractions  FACE: HB 1/6 bilaterally, symmetric appearing, sensation equal bilaterally  ORAL CAVITY: No masses or lesions visualized or palpated, uvula is midline, moist mucous membranes, no oropharyngeal masses or oropharyngeal obstruction  NECK: Normal range of motion, no thyromegaly, trachea is midline, no palpable lymphadenopathy or neck masses, no crepitus  CHEST: Normal respiratory effort, breathing comfortably, no retractions. On RA. SKIN: No rashes, normal appearing skin, no evidence of skin lesions/tumors  NEURO: Cranial Nerves 2, 3, 4, 5, 6, 7, 11, 12 grossly intact bilaterally     I have performed a head and neck physical exam personally or was physically present during the key or critical portions of the service. Assessment and Plan     1. Epistaxis  2. Nasal mucosa dry  -Need to increase nasal moisturization regimen. Increase placement of antibiotic salve to 3 times daily. Increase nasal saline spray and sinonasal saline irrigation use. 3. SOURAV on CPAP  -Nasal dryness likely worsened by CPAP use. He has a humidifier at home but when he tried it a couple years ago he felt like humidification correlated to his start of symptoms so he is hesitant. I would recommend retrying CPAP humidification to help with nasal dryness. 4. DNS (deviated nasal septum)  -See above. Not a candidate for septoplasty until dryness has resolved    5. Throat tightness  -Upper airway grossly patent on prior flexible laryngoscopy. Patient is wondering if there is is any relation to his throat tightness and upper aerodigestive symptoms to kidney or liver issues to which I reassured him that they are more than likely not related. Unfortunately he is not able to tolerate more than a couple days of PPIs or H2 blockers as I would ideally trial him on 1 of these for a couple months to see if it improves his symptoms. He feels like when he takes these his symptoms will get worse after couple days of use. Continue lifestyle modifications for reflux management. Continue follow-up with GI for possible repeat upper endoscopy. Follow-Up     Return if symptoms worsen or fail to improve.       Dr. Yi Mira Rios 48  Department of Otolaryngology/Head and Neck Surgery  8/8/22    Medical Decision Making: The following items were considered in medical decision making:  Independent review of images  Review / order clinical lab tests  Review / order radiology tests  Decision to obtain old records      This note was generated completely or in part utilizing Dragon dictation speech recognition software. Occasionally, words are mistranscribed and despite editing, the text may contain inaccuracies due to incorrect word recognition. If further clarification is needed please contact the office at [phone].

## 2022-08-12 ENCOUNTER — TELEPHONE (OUTPATIENT)
Dept: ENDOCRINOLOGY | Age: 52
End: 2022-08-12

## 2022-08-19 ENCOUNTER — PATIENT MESSAGE (OUTPATIENT)
Dept: RHEUMATOLOGY | Age: 52
End: 2022-08-19

## 2022-08-24 RX ORDER — PREDNISONE 1 MG/1
5 TABLET ORAL DAILY
Qty: 30 TABLET | Refills: 0 | Status: SHIPPED | OUTPATIENT
Start: 2022-08-24 | End: 2022-10-19 | Stop reason: SDUPTHER

## 2022-08-24 NOTE — PROGRESS NOTES
[facility]      Cardiology Progress Note    Bronwyn Miranda  1970 August 26, 2022    Referring Physician: Jason Ortiz CNP  Reason for Referral: SOB, HTN    CC: \"I still am the same. \"     HPI:  The patient is 50 y.o. male with a past medical history significant for HTN, SOURAV, chronic RUQ pain evaluated by GI, GERD and anxiety. He denies tobacco use and rarely drinks Etoh. He has a family hx of heart disease. He presented 1/14/22 for evaluation of chronic, ongoing shortness of breath. He states that he has problems breathing and taking a full breath. He was treated for bronchitis without improvement. He says \"90% of time I cannot complete a breath. \" He also reports headaches and epistaxis which improved after stopping zyrtec D. He also reports chronic RUQ pain, chronic ABD bloating and chronic dyspnea worsened by eating. He is also SOB at night at times. He is concerned that his eyelids and lips also swell. He is not sure if his SOB is related to his ABD pain. His symptoms have been chronic since 6/2020. He feels his NUÑEZ has somewhat improved. The patient denies exertional chest pain/tightness/pressure, palpitations, dizziness, syncope, worsening leg swelling and PND.       4/29/22: he states he still cant get get a deep breath, NUÑEZ with activity, he states his home monitor reveals elevated BP at times. Head stuffiness, nose bleeds. He also reports that he has been evaluated per Dr. Maria L Rust with no identifiable etiology. Today, he reports that he continues to not be able to catch his breath with activity and a \"lump in my throat. \" I have seen GI 6/21/22, started on Protonix and reports symptoms worse after 3 days. He states he can breath the best morning hours. He also has left arm numbness when laying on right side, no hx of spine workup. Reminded that he has AS.  Patient denies exertional chest pain/pressure, dyspnea at rest, PND, orthopnea, palpitations, lightheadedness, weight changes, changes in LE edema, and syncope. Past Medical History:   Diagnosis Date    Ankylosing spondylitis ([address]     Fatty liver  htn      Past Surgical History:   Procedure Laterality Date    CHOLECYSTECTOMY, LAPAROSCOPIC N/A 6/13/2022    LAPAROSCOPIC CHOLECYSTECTOMY WITH CHOLANGIOGRAM performed by Natan Davis MD at [address] [facility] [address]     Family History   Problem Relation Age of Onset    Liver Disease Mother              Atrial Fibrillation Father     Diabetes type 2  Father     Arthritis Father     Hypertension Father      Social History     Tobacco Use    Smoking status: Never    Smokeless tobacco: Never   Vaping Use    Vaping Use: Never used   Substance Use Topics    Alcohol use: Not Currently     Alcohol/week: 0.0 standard drinks     Comment: minimal    Drug use: Never       Allergies   Allergen Reactions    Pcn [Penicillins] Hives     Current Outpatient Medications   Medication Sig Dispense Refill    etodolac (LODINE) 400 MG tablet       predniSONE (DELTASONE) 5 MG tablet Take 1 tablet by mouth daily 30 tablet 0     No current facility-administered medications for this visit. Review of Systems:  Constitutional: no unanticipated weight loss. There's been no change in energy level, sleep pattern, or activity level. No fevers, chills. Eyes: No visual changes or diplopia. No scleral icterus. ENT: No Headaches, hearing loss or vertigo. No mouth sores or sore throat. Cardiovascular: as reviewed in HPI  Respiratory: No cough or wheezing, no sputum production. No hematemesis. Gastrointestinal: No abdominal pain, appetite loss, blood in stools. No change in bowel or bladder habits. Genitourinary: No dysuria, trouble voiding, or hematuria. Musculoskeletal:  No gait disturbance, no joint complaints. Integumentary: No rash or pruritis. Neurological: No headache, diplopia, change in muscle strength, numbness or tingling. Psychiatric: No anxiety or depression.   Endocrine: No temperature intolerance. No excessive thirst, fluid intake, or urination. No tremor. Hematologic/Lymphatic: No abnormal bruising or bleeding, blood clots or swollen lymph nodes. Allergic/Immunologic: No nasal congestion or hives. Physical Exam:   /76   Pulse 80   Ht 6' 2\" (1.88 m)   Wt 253 lb (114.8 kg)   SpO2 96%   BMI 32.48 kg/m²   Wt Readings from Last 3 Encounters:   08/26/22 253 lb (114.8 kg)   08/08/22 240 lb (108.9 kg)   07/05/22 244 lb (110.7 kg)     Constitutional: He is oriented to person, place, and time. He appears well-developed and well-nourished. In no acute distress. Head: Normocephalic and atraumatic. Pupils equal and round. Neck: Neck supple. No JVP or carotid bruit appreciated. No mass and no thyromegaly present. No lymphadenopathy present. Cardiovascular: Normal rate. Normal heart sounds. Exam reveals no gallop and no friction rub. No murmur heard. Pulmonary/Chest: Effort normal and breath sounds normal. No respiratory distress. He has no wheezes, rhonchi or rales. Abdominal: Soft, non-tender. Bowel sounds are normal. He exhibits no organomegaly, mass or bruit. Extremities: No edema, cyanosis, or clubbing. Pulses are 2+ radial/dorsalis pedis/posterior tibial/carotid bilaterally. Neurological: No gross cranial nerve deficit. Coordination normal.   Skin: Skin is warm and dry. There is no rash or diaphoresis. Psychiatric: He has a normal mood and affect. His speech is normal and behavior is normal.     Lab Review:   Lab Results   Component Value Date/Time    TRIG 56 11/22/2021 07:57 AM    HDL 51 11/22/2021 07:57 AM    LDLCALC 95 11/22/2021 07:57 AM    LABVLDL 11 11/22/2021 07:57 AM      Lab Results   Component Value Date/Time    BUN 14 04/14/2022 09:24 AM    CREATININE 0.9 04/14/2022 09:24 AM       EKG Interpretation:   8/2020: EKG TCH interpreted as sinus rhythm, Probable lateral infarct, age indeterminate   1/14/22: NSR, Old inferior-apical infarct.    8/26/22: SR with no acute changes. Image Review:     CT pulmonary 2020 no PE      Stress ECHO 2020 TCH  - Normal echo stress. PFT's done in past per pt  -report unavailable      Echo: 22   Normal left ventricle size, wall thickness, and systolic function with an   estimated ejection fraction of 60-65%. No regional wall motion abnormalities   are seen. Normal diastolic function. The right ventricle is normal in size and function. Trivial tricuspid regurgitation. The ascending aorta is mildly dilated measuring 3.6 cm. Nuclear: 22      Summary    Normal myocardial perfusion study. Normal LV size and systolic function. Normal exercise stress test.    Good exercise capacity for age. Overall findings represent a low risk study. 24 hour BP ambulatory BP monitorin2022 scanned into media and reviewed       Assessment/Plan:     Chronic dyspnea/RUQ ABD pain  -Patient presented as new patient  22 due to symptoms of chest pain and shortness of breath. His symptoms are chronic in nature.  -His work-up for PE was negative in  so was his stress echocardiogram showing no evidence of reversible ischemia.  -Stretch of esophagus with Dr. Thibodeaux [place]  -mother with autoimmune disease.   -22 exercise myoview stress test normal   -22 echo normal     -Today, he continues to reports that he gets NUÑEZ with activity, \"can't catch a deep breath\" with no changes since our last visit   -he did complete 24 hour ambulatory BP monitoring  stable with instructions to work on low salt, heart healthy diet with walking program.   -physical exam again normal  - BP stable off prior HCTZ therapy   -BNP normal   -I do not think his shortness of breath is cardiac in origin. No further work-up will be required  HTN. -BP goal < 130/80  -He has been off of HCTZ few months  -He also took himself off of ZyrtecD.    -Controlled today -BMP 22 stable  -Encouraged strict low salt diet, walking program, weight management.    -Will continue to monitor    Abnormal ECG  -His symptoms are not consistent with underlying ischemic heart disease. .  -Pseudo infarct pattern is suspected  -1/2021 completed Echo and stress which were both normal   -EKG today unchanged from prior. Ankylosing spondylitis   -Mother had autoimmune disease AS as well as Hashimoto. Instructed to f/u for further workup. SOURAV. -Working on compliance with CPAP   -He was prescribed 4-5 years.   -Follow up routinely with sleep medicine. Seasonal allergies  Head stuffiness  Nose bleeds  Encouraged to f/u with PCP or ENT    We will plan  f/u as needed. Thank you very much for allowing me to participate in the care of your patient. Please do not hesitate to contact me if you have any questions. Sincerely,  Deanna Santiago MD      [address] , [facility] [address]  Ph: [phone]  Fax: [phone]      This note was scribed in the presence of Dr. Kvng Gross MD by Christopher Mckeon RN.

## 2022-08-26 ENCOUNTER — OFFICE VISIT (OUTPATIENT)
Dept: CARDIOLOGY CLINIC | Age: 52
End: 2022-08-26
Payer: COMMERCIAL

## 2022-08-26 VITALS
BODY MASS INDEX: 32.47 KG/M2 | DIASTOLIC BLOOD PRESSURE: 76 MMHG | WEIGHT: 253 LBS | HEART RATE: 80 BPM | SYSTOLIC BLOOD PRESSURE: 128 MMHG | HEIGHT: 74 IN | OXYGEN SATURATION: 96 %

## 2022-08-26 DIAGNOSIS — I10 PRIMARY HYPERTENSION: ICD-10-CM

## 2022-08-26 DIAGNOSIS — G47.33 OSA (OBSTRUCTIVE SLEEP APNEA): ICD-10-CM

## 2022-08-26 DIAGNOSIS — R06.09 CHRONIC DYSPNEA: Primary | ICD-10-CM

## 2022-08-26 DIAGNOSIS — R94.31 ABNORMAL EKG: ICD-10-CM

## 2022-08-26 PROCEDURE — 93000 ELECTROCARDIOGRAM COMPLETE: CPT | Performed by: INTERNAL MEDICINE

## 2022-08-26 PROCEDURE — 99214 OFFICE O/P EST MOD 30 MIN: CPT | Performed by: INTERNAL MEDICINE

## 2022-08-26 RX ORDER — ETODOLAC 400 MG/1
400 TABLET, FILM COATED ORAL
COMMUNITY
Start: 2022-08-22

## 2022-08-31 ENCOUNTER — HOSPITAL ENCOUNTER (OUTPATIENT)
Age: 52
Discharge: HOME | End: 2022-08-31
Payer: COMMERCIAL

## 2022-08-31 ENCOUNTER — HOSPITAL ENCOUNTER (OUTPATIENT)
Dept: GENERAL RADIOLOGY | Age: 52
Discharge: HOME | End: 2022-08-31
Payer: COMMERCIAL

## 2022-08-31 ENCOUNTER — OFFICE VISIT (OUTPATIENT)
Dept: RHEUMATOLOGY | Age: 52
End: 2022-08-31
Payer: COMMERCIAL

## 2022-08-31 VITALS
HEART RATE: 84 BPM | WEIGHT: 252 LBS | BODY MASS INDEX: 32.34 KG/M2 | DIASTOLIC BLOOD PRESSURE: 80 MMHG | HEIGHT: 74 IN | SYSTOLIC BLOOD PRESSURE: 142 MMHG

## 2022-08-31 DIAGNOSIS — M45.0 ANKYLOSING SPONDYLITIS OF MULTIPLE SITES IN SPINE (HCC): ICD-10-CM

## 2022-08-31 DIAGNOSIS — M54.2 NECK PAIN: ICD-10-CM

## 2022-08-31 DIAGNOSIS — M54.2 NECK PAIN: Primary | ICD-10-CM

## 2022-08-31 PROCEDURE — 99213 OFFICE O/P EST LOW 20 MIN: CPT | Performed by: INTERNAL MEDICINE

## 2022-08-31 PROCEDURE — 72050 X-RAY EXAM NECK SPINE 4/5VWS: CPT

## 2022-08-31 NOTE — PROGRESS NOTES
Subjective:       Jaleesa Fuchs is a 50 y.o. male patient returns for follow-up of ankylosing spondylitis. He still having symptoms of difficulty breathing feels like there is a knot in his throat. He is not using it Humira. He is using Lodine sparingly and he continues on prednisone 5 mg a day. Review of Systems:    Patient denies morning stiffness. Complains of difficulty breathing. Objective:     BP (!) 142/80   Pulse 84   Ht 6' 2\" (1.88 m)   Wt 252 lb (114.3 kg)   BMI 32.35 kg/m²   Alert male in no acute distress neck full range of motion. No stridor lungs clear to auscultation. Cardiovascular exam normal sinus rhythm. Assessment:    Neck pain. Dyspnea. Ankylosing spondylitis      Plan:      Patient has been extensively evaluated by pulmonary cardiology and endocrinology a diagnosis to explain his symptoms of difficulty breathing. He points to feeling like there is something in his neck anteriorly that is blocking his airway. The patient will get an x-ray of his cervical spine to evaluate whether there could be a large spur. I will see him back in 6 months time.         Yolette Sims MD, MD, 8/31/2022 3:25 PM

## 2022-10-04 ENCOUNTER — OFFICE VISIT (OUTPATIENT)
Dept: ENDOCRINOLOGY | Age: 52
End: 2022-10-04
Payer: COMMERCIAL

## 2022-10-04 VITALS
HEART RATE: 83 BPM | HEIGHT: 74 IN | DIASTOLIC BLOOD PRESSURE: 94 MMHG | BODY MASS INDEX: 32.98 KG/M2 | SYSTOLIC BLOOD PRESSURE: 138 MMHG | WEIGHT: 257 LBS | RESPIRATION RATE: 16 BRPM

## 2022-10-04 DIAGNOSIS — G47.33 OSA (OBSTRUCTIVE SLEEP APNEA): ICD-10-CM

## 2022-10-04 DIAGNOSIS — R53.83 OTHER FATIGUE: ICD-10-CM

## 2022-10-04 DIAGNOSIS — R79.89 ABNORMAL CORTISOL LEVEL: Primary | ICD-10-CM

## 2022-10-04 DIAGNOSIS — M45.7 ANKYLOSING SPONDYLITIS OF LUMBOSACRAL REGION (HCC): ICD-10-CM

## 2022-10-04 PROCEDURE — 99214 OFFICE O/P EST MOD 30 MIN: CPT | Performed by: INTERNAL MEDICINE

## 2022-10-04 RX ORDER — PREDNISONE 1 MG/1
5 TABLET ORAL DAILY
Qty: 30 TABLET | Refills: 3 | Status: CANCELLED | OUTPATIENT
Start: 2022-10-04

## 2022-10-04 NOTE — PROGRESS NOTES
SUBJECTIVE:  Rayne Pereira is a 50 y.o. male who was initially referred here for nonspecific symptoms of shortness of breath and chronic fatigue but during the interview his abnormal weight gain prompted me to test for hypercortisolism. Patient started having SOB , bloating ( started in 2020) , fatigue and headache. He also c/o high blood pressure and weight gain almost 40 lbs in the last one year . He denies any skin color changes. Has a red rash on his neck for years and has been evaluated by dermatologist   current complaints: fatigue, weight gain, palpitations, sweating, heat intolerance  History of obstructive symptoms: difficulty swallowing No, changes in voice/hoarseness No.    History of radiation to patient's neck: NO  Recent iodine exposure: No  Family history includes no thyroid abnormalities. Family history of thyroid cancer: No    He has hypertension and was previously taking HCTZ and captopril but they were stopped after he ad a normal echocardiogram.  Blood pressure was high on initial evaluation. Patient is advised to check his blood pressure at home and call me back if it stays elevated. He has been on steroid off and on for breathing issues and was getting steroid tapers. He has been to pulmonologist in the past ad has bee diagnosed with Sleep apnea and wears masks. He was given some inhalers which didn't help at all. He has been diagnosed with ankylosing spondylitis and was prescribed sulfasalazine and Taltz by Dr Annalisa Hickey. He has not started using these medications yet. Interim history  Patient completed his 24-hour urine collection x2 and has also done a 1 mg dexamethasone suppression test and both of these testing were abnormal pointing towards hypercortisolism. Patient does have slightly uncontrolled hypertension but no classic symptoms of Cushing's. He still continues to struggle with breathing problems.   He was given prednisone 5 mg for a month by Dr Larry Minor Past Medical History:   Diagnosis Date    Ankylosing spondylitis ([facility]     Fatty liver     Hypertension     SOURAV on CPAP      Patient Active Problem List    Diagnosis Date Noted    Calculus of gallbladder without cholecystitis without obstruction     RUQ pain 01/12/2022    Anxiety 12/29/2021    Primary hypertension 12/29/2021    SOURAV (obstructive sleep apnea)     Hypersomnia 11/09/2020    Shortness of breath 11/09/2020    Alpha-1-antitrypsin deficiency carrier 11/09/2020    Rash 11/09/2020    Ankylosing spondylitis ([facility] 07/13/2011    Dysfunction of eustachian tube 08/10/2010     Past Surgical History:   Procedure Laterality Date    CHOLECYSTECTOMY, LAPAROSCOPIC N/A 6/13/2022    LAPAROSCOPIC CHOLECYSTECTOMY WITH CHOLANGIOGRAM performed by Jack Deal MD at [address] [place] [address]     Family History   Problem Relation Age of Onset    Liver Disease Mother              Atrial Fibrillation Father     Diabetes type 2  Father     Arthritis Father     Hypertension Father      Social History     Socioeconomic History    Marital status: Single     Spouse name: None    Number of children: None    Years of education: None    Highest education level: None   Tobacco Use    Smoking status: Never    Smokeless tobacco: Never   Vaping Use    Vaping Use: Never used   Substance and Sexual Activity    Alcohol use: Not Currently     Alcohol/week: 0.0 standard drinks     Comment: minimal    Drug use: Never    Sexual activity: Not Currently     Social Determinants of Health     Financial Resource Strain: Low Risk     Difficulty of Paying Living Expenses: Not hard at all   Food Insecurity: No Food Insecurity    Worried About Running Out of Food in the Last Year: Never true    Ran Out of Food in the Last Year: Never true     Current Outpatient Medications   Medication Sig Dispense Refill    etodolac (LODINE) 400 MG tablet Take 400 mg by mouth One or two a week      predniSONE (DELTASONE) 5 MG tablet Take 1 tablet by mouth daily 30 tablet 0     No current facility-administered medications for this visit. Allergies   Allergen Reactions    Pcn [Penicillins] Hives         Review of Systems:  I have reviewed the review of system questionnaire filled by the patient .   Patient was advised to contact PCP for non endocrine signs and symptoms       OBJECTIVE:   BP (!) 138/94   Pulse 83   Resp 16   Ht 6' 2\" (1.88 m)   Wt 257 lb (116.6 kg)   BMI 33.00 kg/m²   Wt Readings from Last 3 Encounters:   10/04/22 257 lb (116.6 kg)   08/31/22 252 lb (114.3 kg)   08/26/22 253 lb (114.8 kg)       Physical Exam:    Constitutional: no acute distress, well appearing, well nourished  Psychiatric: oriented to person, place and time, judgement, insight and normal, recent and remote memory and intact and mood, affect are normal  Skin: skin and subcutaneous tissue is normal without mass,   Head and Face: examination of head and face revealed no abnormalities  Eyes: no lid or conjunctival swelling, no erythema or discharge, pupils are normal,   Ears/Nose: external inspection of ears and nose revealed no abnormalities, hearing is grossly normal  Oropharynx/Mouth/Face: lips, tongue and gums are normal with no lesions, the voice quality was normal  Neck: neck is supple and symmetric, with midline trachea and no masses, thyroid is normal    Pulmonary: no increased work of breathing or signs of respiratory distress, lungs are clear to auscultation  Cardiovascular: normal heart rate and rhythm, normal S1 and S2,   Musculoskeletal: normal gait and station,   Neurological: normal coordination, normal general cortical function      Lab Review:  Lab Results   Component Value Date/Time    TSH 0.97 04/14/2022 09:24 AM    TSH 1.36 03/03/2022 02:54 PM    TSH 0.78 08/12/2020 03:49 PM     Lab Results   Component Value Date/Time    T4FREE 1.3 04/14/2022 09:24 AM        ASSESSMENT/PLAN:      --- Hypercortisolism   MRI brain didn't show any pituitary abnormality in august 2022   He does have slight facial plethora with 40 pounds weight gain with elevated blood pressure in the last 1 year for which I ordered 24-hour urine cortisol. --Initial blood work in April 2022 showed normal testosterone at 376 ng/dL, IGF-I level normal at 194 ng/mL, LH normal at 4.2  FSH normal at 5.8, thyroid antibodies negative, TSH normal at 0.97, normal free T4 at 1.3, serotonin level was normal at 168, tryptase was normal at 5.2, plasma metanephrines were all within normal limits, 24-hour urine cortisol was elevated at 70.7 whereas the upper limit of cardio was 60 mcg/dL, 24-hour urine creatinine was 2392 which was within normal limits. In May 2022 a urine screen for synthetic steroids was negative, salivary cortisol in June 2022 was 0.124 but the specimen was contaminated with blood. Repeat salivary cortisol on June 28, 2022 was normal at 0.033. Patient underwent dexamethasone suppression test on July 1, 2022 the cortisol value was 2.3 mcg/dL, dexamethasone level was 180 which was within the expected range after 1 mg tablet taken the night before. Repeat urinary cortisol was again slightly elevated at 60.8 whereas the upper limit of normal was 60mcg/dL. -- On July 15 patient had morning cortisol drawn at 7:23 AM with a value of 12.9 and a concomitant ACTH of 29. Due to nonsuppressed ACTH ---MRI done which didnt show any pituitary abnormality . IPSS can be done, since cortisol levels are not remarkably elevated I would recommend repeating urinary cortisol in 6 months again. He did take prednisone for 1 month starting August 24 to September 24, 2022. Most likely for ankylosing spondylitis he did not notice any improvement in his symptoms  Previously patient had a CT scan of abdomen done in April 2021 in which adrenal glands were reported as unremarkable. Will repeat salivary cortisol and order MRI brain as his ACTH continues to be not suppressed.   Thyroid ultrasound was normal in August 2022.    2. Shortness of breath  Previously was evaluated by 2 pulmonologist and had abnormal sleep studies and was prescribed CPAP mask with no improvement in current symptoms. Previously was also prescribed inhalers which did not help with the shortness of breath. 3. Ankylosing spondylitis of lumbosacral region [facility])  Patient follows with rheumatologist and was prescribed taltz and sulfasalazine he is not taking any of those he was also prescribed etodolac. The patient requested to order a Lyme disease panel he was advised to follow-up with his primary care physician for those results    Reviewed and/or ordered clinical lab results Yes  Reviewed and/or ordered radiology tests Yes   Reviewed and/or ordered other diagnostic tests Yes  Made a decision to obtain old records Yes  Reviewed and summarized old records Yes      Tiffani Stewart was counseled regarding symptoms of current diagnosis, course and complications of disease if inadequately treated, side effects of medications, diagnosis, treatment options, and prognosis, risks, benefits, complications, and alternatives of treatment, labs, imaging and other studies and treatment targets and goals. He understands instructions and counseling. I spent  30 + minutes which includes reviewing patient chart , interpreting previous lab results  , discussing and providing counseling and coordinating care of patient's multiple health issues with  the patient. Return in about 6 months (around 4/4/2023). Please note that some or all of this report was generated using voice recognition software. Please notify me in case of any questions about the content of this document, as some errors in transcription may have occurred .

## 2022-10-11 ENCOUNTER — HOSPITAL ENCOUNTER (OUTPATIENT)
Age: 52
Discharge: HOME | End: 2022-10-11
Payer: COMMERCIAL

## 2022-10-11 LAB
HBV SURFACE AB TITR SER: 315.9 MIU/ML
HEPATITIS B SURFACE ANTIGEN INTERPRETATION: NORMAL

## 2022-10-11 PROCEDURE — 87340 HEPATITIS B SURFACE AG IA: CPT

## 2022-10-11 PROCEDURE — 86706 HEP B SURFACE ANTIBODY: CPT

## 2022-10-11 PROCEDURE — 86803 HEPATITIS C AB TEST: CPT

## 2022-10-11 PROCEDURE — 82104 ALPHA-1-ANTITRYPSIN PHENO: CPT

## 2022-10-11 PROCEDURE — 36415 COLL VENOUS BLD VENIPUNCTURE: CPT

## 2022-10-11 PROCEDURE — 82103 ALPHA-1-ANTITRYPSIN TOTAL: CPT

## 2022-10-13 LAB
HEPATITIS C VIRUS AB BY CIA INDEX: <0.02 IV
HEPATITIS C VIRUS AB BY CIA INTERPRETATION: NEGATIVE

## 2022-10-17 LAB
ALPHA-1 ANTITRYPSIN PHENOTYPE: ABNORMAL
ALPHA-1 ANTITRYPSIN: 81 MG/DL (ref 90–200)

## 2022-10-19 ENCOUNTER — PATIENT MESSAGE (OUTPATIENT)
Dept: RHEUMATOLOGY | Age: 52
End: 2022-10-19

## 2022-10-19 DIAGNOSIS — M45.0 ANKYLOSING SPONDYLITIS OF MULTIPLE SITES IN SPINE (HCC): Primary | ICD-10-CM

## 2022-10-19 RX ORDER — PREDNISONE 1 MG/1
5 TABLET ORAL DAILY
Qty: 30 TABLET | Refills: 0 | Status: SHIPPED | OUTPATIENT
Start: 2022-10-19

## 2022-10-19 NOTE — TELEPHONE ENCOUNTER
From: Brady Singleton  To: Dr. Lori Urbanot: 10/19/2022 11:41 AM EDT  Subject: Prescription    Good Morning,  Can I get a refill on my prescription of Prednisone 5mg? Also,   Have you though about where we should go with figuring out what might be bothering me with my health? I am still having the same symptoms. Problems breathing,  Problems swollowing / lump in my throat. Blurry vision . Problems hearing . Numbness and burning in my left thigh and at the top of my knee. Flush face / always feel hot.  a lot of headaches. I wonder if Lupus or MS would be something to look at.   Thanks   Philip Maharaj

## 2022-12-01 ENCOUNTER — HOSPITAL ENCOUNTER (OUTPATIENT)
Dept: PULMONOLOGY | Age: 52
Discharge: HOME | End: 2022-12-01
Payer: COMMERCIAL

## 2022-12-01 DIAGNOSIS — R06.02 SOB (SHORTNESS OF BREATH): ICD-10-CM

## 2022-12-01 PROCEDURE — 94726 PLETHYSMOGRAPHY LUNG VOLUMES: CPT | Performed by: INTERNAL MEDICINE

## 2022-12-01 PROCEDURE — 94060 EVALUATION OF WHEEZING: CPT

## 2022-12-01 PROCEDURE — 94640 AIRWAY INHALATION TREATMENT: CPT

## 2022-12-01 PROCEDURE — 6360000002 HC RX W HCPCS: Performed by: INTERNAL MEDICINE

## 2022-12-01 PROCEDURE — 94070 EVALUATION OF WHEEZING: CPT

## 2022-12-01 PROCEDURE — 94729 DIFFUSING CAPACITY: CPT | Performed by: INTERNAL MEDICINE

## 2022-12-01 PROCEDURE — 94060 EVALUATION OF WHEEZING: CPT | Performed by: INTERNAL MEDICINE

## 2022-12-01 PROCEDURE — 94070 EVALUATION OF WHEEZING: CPT | Performed by: INTERNAL MEDICINE

## 2022-12-01 PROCEDURE — 94726 PLETHYSMOGRAPHY LUNG VOLUMES: CPT

## 2022-12-01 PROCEDURE — 94729 DIFFUSING CAPACITY: CPT

## 2022-12-01 RX ORDER — METHACHOLINE CHLORIDE 0-48MG/3ML
1 VIAL, NEBULIZER (ML) INHALATION ONCE
Status: COMPLETED | OUTPATIENT
Start: 2022-12-01 | End: 2022-12-01

## 2022-12-01 RX ORDER — ALBUTEROL SULFATE 2.5 MG/3ML
2.5 SOLUTION RESPIRATORY (INHALATION) ONCE
Status: COMPLETED | OUTPATIENT
Start: 2022-12-01 | End: 2022-12-01

## 2022-12-01 RX ADMIN — Medication 1 KIT: at 09:52

## 2022-12-01 RX ADMIN — ALBUTEROL SULFATE 2.5 MG: 2.5 SOLUTION RESPIRATORY (INHALATION) at 10:19

## 2022-12-01 NOTE — PROCEDURES
spirometry was acceptable and reproducible by ATS standards      Spirometry/Flow volume loop:  No active airflow obstruction or statistically significant postbronchodilator response    Lung volumes:  Extraparenchymal restriction secondary to patient's body habitus    Diffusing capacity:  DLCO normal     Impression:  Normal PFT except for extraparenchymal restriction secondary to patient's body habitus. Methacholine challenge test as positive with PC 20 of 40 mg/mL consistent with mild hyperresponsiveness. No results found for: [id number], [id number], TLCPCTPREPRE, DLCOVAPCTR        OBSTRUCTION % Predicted FEV1   MILD >70%   MODERATE 60-69%   MODERATELY-SEVERE 50-59%   SEVERE 35-49%   VERY SEVERE <35%         RESTRICTION % Predicted TLC   MILD 66-80%   MODERATE 54-65%   MODERATELY-SEVERE <54%                 DIFFUSION CAPACITY DLCO % Pred   MILD >60% AND < LLN   MODERATE 40-60%   SEVERE <40%       PFT data will be scanned into the media tab under this encounter. Please see the scanned data for numerical values.      Cody Rachel MD  [facility] Pulmonary, Sleep and Critical Care Medicine

## 2022-12-08 ENCOUNTER — HOSPITAL ENCOUNTER (OUTPATIENT)
Dept: CARDIAC REHAB | Age: 52
Setting detail: THERAPIES SERIES
Discharge: HOME | End: 2022-12-08
Payer: COMMERCIAL

## 2022-12-08 ENCOUNTER — TELEPHONE (OUTPATIENT)
Dept: PULMONOLOGY | Age: 52
End: 2022-12-08

## 2022-12-08 DIAGNOSIS — R06.02 SOB (SHORTNESS OF BREATH): ICD-10-CM

## 2022-12-08 PROCEDURE — 94618 PULMONARY STRESS TESTING: CPT | Performed by: INTERNAL MEDICINE

## 2022-12-08 PROCEDURE — 94618 PULMONARY STRESS TESTING: CPT

## 2022-12-08 NOTE — TELEPHONE ENCOUNTER
----- Message from Mariah Jenkins MD sent at 12/8/2022 12:52 PM EST -----  No need for oxygen based on this test

## 2022-12-08 NOTE — TELEPHONE ENCOUNTER
CARLOS patient and gave him the 6 MWT results. CARLOS patient and gave him the results. Patient wants to know why he still cannot take deep breaths? He does not have a follow up at this time, and wants to know when you want to see him for a follow up?

## 2022-12-08 NOTE — PROCEDURES
[facility] Cardiopulmonary Rehabilitation   Six Minute Walk Test    Izabela Rothlelo SALGADOB: 1970  Account Number: [de-identified]  AGE: 50 y.o.     OP test [x]   Pulmonary Rehab PRE []  POST   []    Diagnosis: SOB     Referring Physician: Dr. Alfonso Felton    Gender [x]  male [] female AGE:52     Height:6 ft 2 in 188 cm    Weight:259 lbs  117 kg  Blood Pressure 140/70      Medications affecting heart rate:  none      Supplemental O2 during the test   no [x]      yes  lpm     [] continuous []  intermittent    Device : [] NC []  HFNC    []  oximizer  [] mask      Laps completed: 11   (1 lap = 100 ft)        Baseline (resting) Walking End of Test (recovery)      Heart Rate 76   91  80    BP  140/70   158/74  146/70    Dyspnea  2    2   2 (Taj scale)    Fatigue  2    2   2 (Taj scale)    SpO2  97%   94%                97%      Stopped or paused before 6 mins? [x]  no [] yes How long? Symptoms at end of exercise:[] angina  [] dizziness  []  hip, leg, calf, back pain       [x]  no complaints []  other    Number of laps:11 (x 30.48 meters) + final partial lap: 18 meters     Total distance walked in 6 mins: 354 meters    Predicted distance: 656 meters  Percent predicted:54%              [] normal       [x] reduced     Summary: This is an outpatient 6 minute walk test, performed on room air. The patient ambulated 354 meters which is normal-54% predicted. His, heart rate response was normal, the blood pressure response was normal, oxygen saturations were normal.  The patient desaturated to 94% while ambulating on room air. The degree of symptoms based on the Taj Dyspnea/Fatigue scale were unchanged with testing. This test does not indicate the need for supplemental oxygen.           Jean Mayer DO  Pulmonary Rehab Director

## 2023-03-22 RX ORDER — ETODOLAC 400 MG/1
TABLET, FILM COATED ORAL
Qty: 60 TABLET | Refills: 2 | OUTPATIENT
Start: 2023-03-22

## 2023-06-06 LAB
CREAT SERPL-MCNC: 0.93 MG/DL (ref 0.6–1.3)
GFR, ESTIMATED: >90

## 2023-10-13 ENCOUNTER — TELEPHONE (OUTPATIENT)
Dept: ENDOCRINOLOGY | Age: 53
End: 2023-10-13

## 2023-10-13 DIAGNOSIS — R79.89 ABNORMAL CORTISOL LEVEL: Primary | ICD-10-CM

## 2023-10-16 NOTE — TELEPHONE ENCOUNTER
Please advise patient I have placed lab orders before the next visit as well as blood work and urine testing the blood work needs to be done upon awakening.

## 2023-12-06 ENCOUNTER — TELEPHONE (OUTPATIENT)
Dept: ENDOCRINOLOGY | Age: 53
End: 2023-12-06

## 2023-12-06 NOTE — TELEPHONE ENCOUNTER
The number listed is not correct I have called it a couple of times can you please contact his nurse and find out what would be a good number to contact Dr. Abel Penny.

## 2023-12-06 NOTE — TELEPHONE ENCOUNTER
Dr. Billy Mehta nurse from [facility] called stating that Dr. Monet Amado is requesting to speak with Dr. Shamika Monzon to discuss pt's history     Dr. Monet Amado can be reached at [phone]  Please advise

## 2023-12-07 NOTE — TELEPHONE ENCOUNTER
Please advise patient that I had a discussion with his rheumatologist and he would like the patient to be seen by me as soon as possible and I was hoping that he already has a follow-up appointment as a yearly follow-up.   I have already placed lab orders a month ago so he should go ahead and do the 24-hour urine and upon awakening blood work for me and then I will try to see him in the next few days if he can do the 24-hour urine either start tomorrow or over the weekend and I should be able to see him by Tuesday or Wednesday before I leave

## 2023-12-07 NOTE — TELEPHONE ENCOUNTER
Dr. Norma Talavera nurse called back requesting to speak with Dr. Gabriel Horne as soon as she's available     Please advise   CB# [phone]

## 2023-12-09 DIAGNOSIS — R79.89 ABNORMAL CORTISOL LEVEL: ICD-10-CM

## 2023-12-09 LAB
ALBUMIN SERPL-MCNC: 4.4 G/DL (ref 3.4–5)
ALBUMIN/GLOB SERPL: 1.6 {RATIO} (ref 1.1–2.2)
ALP SERPL-CCNC: 102 U/L (ref 40–129)
ALT SERPL-CCNC: 82 U/L (ref 10–40)
ANION GAP SERPL CALCULATED.3IONS-SCNC: 8 MMOL/L (ref 3–16)
AST SERPL-CCNC: 44 U/L (ref 15–37)
BILIRUB SERPL-MCNC: 0.6 MG/DL (ref 0–1)
BUN SERPL-MCNC: 16 MG/DL (ref 7–20)
CALCIUM SERPL-MCNC: 9.6 MG/DL (ref 8.3–10.6)
CHLORIDE SERPL-SCNC: 102 MMOL/L (ref 99–110)
CO2 SERPL-SCNC: 28 MMOL/L (ref 21–32)
CORTIS AM PEAK SERPL-MCNC: 17.1 UG/DL (ref 4.3–22.4)
CREAT SERPL-MCNC: 1 MG/DL (ref 0.9–1.3)
GFR SERPLBLD CREATININE-BSD FMLA CKD-EPI: >60 ML/MIN/{1.73_M2}
GLUCOSE SERPL-MCNC: 106 MG/DL (ref 70–99)
POTASSIUM SERPL-SCNC: 4.2 MMOL/L (ref 3.5–5.1)
PROT SERPL-MCNC: 7.2 G/DL (ref 6.4–8.2)
SODIUM SERPL-SCNC: 138 MMOL/L (ref 136–145)

## 2023-12-12 LAB — ACTH PLAS-MCNC: 40 PG/ML (ref 7–69)

## 2023-12-14 ENCOUNTER — OFFICE VISIT (OUTPATIENT)
Dept: ENDOCRINOLOGY | Age: 53
End: 2023-12-14

## 2023-12-14 VITALS
HEIGHT: 74 IN | BODY MASS INDEX: 34.78 KG/M2 | WEIGHT: 271 LBS | DIASTOLIC BLOOD PRESSURE: 103 MMHG | HEART RATE: 81 BPM | SYSTOLIC BLOOD PRESSURE: 159 MMHG

## 2023-12-14 DIAGNOSIS — R06.02 SOB (SHORTNESS OF BREATH): ICD-10-CM

## 2023-12-14 DIAGNOSIS — E27.0 HYPERCORTISOLEMIA (HCC): Primary | ICD-10-CM

## 2023-12-14 DIAGNOSIS — M45.7 ANKYLOSING SPONDYLITIS OF LUMBOSACRAL REGION (HCC): ICD-10-CM

## 2023-12-14 LAB
COLLECT DURATION TIME SPEC: 24 H
CORTIS F 24H UR HPLC-MCNC: 26.9 UG/L
CORTIS F 24H UR-MRATE: 45.7 UG/D
CORTIS F/CREAT 24H UR: 17.24 UG/G CRT
CREAT 24H UR-MCNC: 156 MG/DL
CREAT 24H UR-MRATE: 2652 MG/D (ref 800–2100)
DHEA-S SERPL-MCNC: 167 UG/DL (ref 44.3–331)
IMP & REVIEW OF LAB RESULTS: ABNORMAL
SPECIMEN VOL ?TM UR: 1700 ML

## 2023-12-14 RX ORDER — SPIRONOLACTONE 50 MG/1
50 TABLET, FILM COATED ORAL DAILY
Qty: 90 TABLET | Refills: 1 | Status: SHIPPED | OUTPATIENT
Start: 2023-12-14

## 2023-12-14 NOTE — PROGRESS NOTES
counseling. I spent  40 + minutes which includes reviewing patient chart , interpreting previous lab results  , discussing and providing counseling and coordinating care of patient's multiple health issues with  the patient. Return in about 6 weeks (around 1/25/2024). Please note that some or all of this report was generated using voice recognition software. Please notify me in case of any questions about the content of this document, as some errors in transcription may have occurred .

## 2024-02-03 DIAGNOSIS — R06.02 SOB (SHORTNESS OF BREATH): ICD-10-CM

## 2024-02-03 DIAGNOSIS — M45.7 ANKYLOSING SPONDYLITIS OF LUMBOSACRAL REGION (HCC): ICD-10-CM

## 2024-02-03 DIAGNOSIS — E27.0 HYPERCORTISOLEMIA (HCC): ICD-10-CM

## 2024-02-03 LAB
ALBUMIN SERPL-MCNC: 4.7 G/DL (ref 3.4–5)
ALBUMIN/GLOB SERPL: 2.1 {RATIO} (ref 1.1–2.2)
ALP SERPL-CCNC: 96 U/L (ref 40–129)
ALT SERPL-CCNC: 69 U/L (ref 10–40)
ANION GAP SERPL CALCULATED.3IONS-SCNC: 6 MMOL/L (ref 3–16)
AST SERPL-CCNC: 35 U/L (ref 15–37)
BILIRUB SERPL-MCNC: 0.5 MG/DL (ref 0–1)
BUN SERPL-MCNC: 19 MG/DL (ref 7–20)
CALCIUM SERPL-MCNC: 9.3 MG/DL (ref 8.3–10.6)
CHLORIDE SERPL-SCNC: 104 MMOL/L (ref 99–110)
CO2 SERPL-SCNC: 30 MMOL/L (ref 21–32)
CREAT SERPL-MCNC: 0.8 MG/DL (ref 0.9–1.3)
FSH SERPL-ACNC: 6.4 MIU/ML
GFR SERPLBLD CREATININE-BSD FMLA CKD-EPI: >60 ML/MIN/{1.73_M2}
GLUCOSE SERPL-MCNC: 112 MG/DL (ref 70–99)
LH SERPL-ACNC: 5.5 MIU/ML
POTASSIUM SERPL-SCNC: 4.5 MMOL/L (ref 3.5–5.1)
PROLACTIN SERPL IA-MCNC: 11.2 NG/ML
PROT SERPL-MCNC: 6.9 G/DL (ref 6.4–8.2)
SODIUM SERPL-SCNC: 140 MMOL/L (ref 136–145)
TSH SERPL DL<=0.005 MIU/L-ACNC: 1.09 UIU/ML (ref 0.27–4.2)

## 2024-02-04 LAB — CORTIS AM PEAK SERPL-MCNC: 8.9 UG/DL (ref 4.3–22.4)

## 2024-02-06 LAB
IGF-I SERPL-MCNC: 153 NG/ML (ref 66–225)
IGF-I Z-SCORE SERPL: 0.5

## 2024-02-07 LAB
ACTH PLAS-MCNC: 31 PG/ML (ref 7–63)
ANNOTATION COMMENT IMP: NORMAL
METANEPHS SERPL-SCNC: 0.29 NMOL/L (ref 0–0.49)
NORMETANEPHRINE SERPL-SCNC: 0.31 NMOL/L (ref 0–0.89)

## 2024-03-21 ENCOUNTER — OFFICE VISIT (OUTPATIENT)
Dept: ENDOCRINOLOGY | Age: 54
End: 2024-03-21
Payer: COMMERCIAL

## 2024-03-21 VITALS
HEART RATE: 92 BPM | WEIGHT: 271 LBS | SYSTOLIC BLOOD PRESSURE: 138 MMHG | DIASTOLIC BLOOD PRESSURE: 96 MMHG | BODY MASS INDEX: 34.78 KG/M2 | HEIGHT: 74 IN | RESPIRATION RATE: 16 BRPM

## 2024-03-21 DIAGNOSIS — I10 PRIMARY HYPERTENSION: ICD-10-CM

## 2024-03-21 DIAGNOSIS — E27.0 HYPERCORTISOLEMIA (HCC): Primary | ICD-10-CM

## 2024-03-21 DIAGNOSIS — M45.7 ANKYLOSING SPONDYLITIS OF LUMBOSACRAL REGION (HCC): ICD-10-CM

## 2024-03-21 PROCEDURE — 3075F SYST BP GE 130 - 139MM HG: CPT | Performed by: INTERNAL MEDICINE

## 2024-03-21 PROCEDURE — 99214 OFFICE O/P EST MOD 30 MIN: CPT | Performed by: INTERNAL MEDICINE

## 2024-03-21 PROCEDURE — 3080F DIAST BP >= 90 MM HG: CPT | Performed by: INTERNAL MEDICINE

## 2024-03-21 RX ORDER — SPIRONOLACTONE 100 MG/1
100 TABLET, FILM COATED ORAL DAILY
Qty: 90 TABLET | Refills: 5 | Status: SHIPPED | OUTPATIENT
Start: 2024-03-21

## 2024-03-21 RX ORDER — SPIRONOLACTONE 50 MG/1
50 TABLET, FILM COATED ORAL DAILY
Qty: 90 TABLET | Refills: 1 | Status: CANCELLED | OUTPATIENT
Start: 2024-03-21

## 2024-03-21 NOTE — PROGRESS NOTES
Results   Component Value Date/Time    TSH 0.97 04/14/2022 09:24 AM    TSH 1.36 03/03/2022 02:54 PM    TSH 0.78 08/12/2020 03:49 PM     Lab Results   Component Value Date/Time    T4FREE 1.3 04/14/2022 09:24 AM        ASSESSMENT/PLAN:      --- Hypercortisolism   MRI brain didn't show any pituitary abnormality in august 2022   He does have slight facial plethora with 40 pounds weight gain with elevated blood pressure in the last 1 year for which I ordered 24-hour urine cortisol.  In December 2023 his 24-hour urine cortisol came back normal with the 24-hour urine cortisol value of 45.7 the upper limit of normal is 60 mcg/day despite having a large amount of urine collected his 24-hour urine creatinine was above the 24-hour urine creatinine normal limits.  In December 2023 he had morning cortisol value of 17.1 drawn at 8 in the morningWith an ACTH value of 40      --Initial blood work in April 2022 showed normal testosterone at 376 ng/dL, IGF-I level normal at 194 ng/mL, LH normal at 4.2  FSH normal at 5.8, thyroid antibodies negative, TSH normal at 0.97, normal free T4 at 1.3, serotonin level was normal at 168, tryptase was normal at 5.2, plasma metanephrines were all within normal limits, 24-hour urine cortisol was elevated at 70.7 whereas the upper limit of cortisol was 60 mcg/dL, 24-hour urine creatinine was 2392 which was within normal limits.  In May 2022 a urine screen for synthetic steroids was negative, salivary cortisol in June 2022 was 0.124 but the specimen was contaminated with blood.  Repeat salivary cortisol on June 28, 2022 was normal at 0.033.  Patient underwent dexamethasone suppression test on July 1, 2022 the cortisol value was 2.3 mcg/dL, dexamethasone level was 180 which was within the expected range after 1 mg tablet taken the night before.  Repeat urinary cortisol was again slightly elevated at 60.8 whereas the upper limit of normal was 60mcg/dL.  --  24-hour urine cortisol done in December 2023

## 2024-09-17 DIAGNOSIS — M45.7 ANKYLOSING SPONDYLITIS OF LUMBOSACRAL REGION (HCC): ICD-10-CM

## 2024-09-17 DIAGNOSIS — R06.02 SOB (SHORTNESS OF BREATH): ICD-10-CM

## 2024-09-17 DIAGNOSIS — E27.0 HYPERCORTISOLEMIA (HCC): ICD-10-CM

## 2024-09-17 LAB
CORTIS AM PEAK SERPL-MCNC: 18.5 UG/DL (ref 4.3–22.4)
TSH SERPL DL<=0.005 MIU/L-ACNC: 1.15 UIU/ML (ref 0.27–4.2)

## 2024-09-19 ENCOUNTER — OFFICE VISIT (OUTPATIENT)
Dept: ENDOCRINOLOGY | Age: 54
End: 2024-09-19
Payer: COMMERCIAL

## 2024-09-19 VITALS
BODY MASS INDEX: 34.78 KG/M2 | HEART RATE: 88 BPM | HEIGHT: 74 IN | WEIGHT: 271 LBS | DIASTOLIC BLOOD PRESSURE: 98 MMHG | RESPIRATION RATE: 14 BRPM | TEMPERATURE: 98 F | SYSTOLIC BLOOD PRESSURE: 134 MMHG

## 2024-09-19 DIAGNOSIS — I10 PRIMARY HYPERTENSION: ICD-10-CM

## 2024-09-19 DIAGNOSIS — E27.0 HYPERCORTISOLEMIA (HCC): Primary | ICD-10-CM

## 2024-09-19 DIAGNOSIS — M45.7 ANKYLOSING SPONDYLITIS OF LUMBOSACRAL REGION (HCC): ICD-10-CM

## 2024-09-19 LAB
SHBG SERPL-SCNC: 25 NMOL/L (ref 19–76)
TESTOST FREE SERPL-MCNC: 62.8 PG/ML (ref 47–244)
TESTOST SERPL-MCNC: 276 NG/DL (ref 193–740)

## 2024-09-19 PROCEDURE — 99214 OFFICE O/P EST MOD 30 MIN: CPT | Performed by: INTERNAL MEDICINE

## 2024-09-19 PROCEDURE — 3075F SYST BP GE 130 - 139MM HG: CPT | Performed by: INTERNAL MEDICINE

## 2024-09-19 PROCEDURE — 3080F DIAST BP >= 90 MM HG: CPT | Performed by: INTERNAL MEDICINE

## 2024-09-19 RX ORDER — SULFASALAZINE 500 MG/1
500 TABLET ORAL 4 TIMES DAILY
COMMUNITY

## 2024-09-20 LAB — ACTH PLAS-MCNC: 39 PG/ML (ref 7–63)
